# Patient Record
Sex: MALE | Race: WHITE | NOT HISPANIC OR LATINO | Employment: OTHER | URBAN - METROPOLITAN AREA
[De-identification: names, ages, dates, MRNs, and addresses within clinical notes are randomized per-mention and may not be internally consistent; named-entity substitution may affect disease eponyms.]

---

## 2017-02-13 DIAGNOSIS — J44.9 COPD (CHRONIC OBSTRUCTIVE PULMONARY DISEASE) (H): Primary | ICD-10-CM

## 2017-05-16 ENCOUNTER — CARE COORDINATION (OUTPATIENT)
Dept: PULMONOLOGY | Facility: CLINIC | Age: 62
End: 2017-05-16

## 2017-05-16 NOTE — PROGRESS NOTES
Patient called in to triage line and said that he has been coughing up green sputum since Friday of last week and wanted to know if Dr. Zimmerman would prescribe any antibiotics. Patient denies any fevers/chills, but did report that he has been sleeping a lot recently. Writer sent message to Dr. Zimmerman for recommendations. Patient is due to see Dr. Zimmerman on 5-24, but has not been seen by provider since November 2016. Per Dr. Zimmerman he recommends that the patient follow up with his PCP and if he is too ill to see PCP he should go to an urgent care. Writer gave this information to the patient who verbalized understanding of this information.

## 2017-05-24 ENCOUNTER — OFFICE VISIT (OUTPATIENT)
Dept: PULMONOLOGY | Facility: CLINIC | Age: 62
End: 2017-05-24
Attending: INTERNAL MEDICINE
Payer: COMMERCIAL

## 2017-05-24 VITALS
SYSTOLIC BLOOD PRESSURE: 142 MMHG | HEART RATE: 85 BPM | RESPIRATION RATE: 16 BRPM | WEIGHT: 131.1 LBS | OXYGEN SATURATION: 97 % | BODY MASS INDEX: 18.77 KG/M2 | HEIGHT: 70 IN | DIASTOLIC BLOOD PRESSURE: 88 MMHG

## 2017-05-24 DIAGNOSIS — Z87.891 HISTORY OF TOBACCO USE: Primary | ICD-10-CM

## 2017-05-24 DIAGNOSIS — J44.9 COPD (CHRONIC OBSTRUCTIVE PULMONARY DISEASE) (H): ICD-10-CM

## 2017-05-24 DIAGNOSIS — J01.90 ACUTE SINUSITIS, RECURRENCE NOT SPECIFIED, UNSPECIFIED LOCATION: ICD-10-CM

## 2017-05-24 LAB
DLCOUNC-%PRED-PRE: 61 %
DLCOUNC-PRE: 17.3 ML/MIN/MMHG
DLCOUNC-PRED: 28.33 ML/MIN/MMHG
ERV-%PRED-PRE: 60 %
ERV-PRE: 1.05 L
ERV-PRED: 1.74 L
EXPTIME-PRE: 18.9 SEC
FEF2575-%PRED-POST: 33 %
FEF2575-%PRED-PRE: 14 %
FEF2575-POST: 0.95 L/SEC
FEF2575-PRE: 0.41 L/SEC
FEF2575-PRED: 2.83 L/SEC
FEFMAX-%PRED-PRE: 42 %
FEFMAX-PRE: 3.91 L/SEC
FEFMAX-PRED: 9.14 L/SEC
FEV1-%PRED-PRE: 41 %
FEV1-PRE: 1.37 L
FEV1FEV6-PRE: 47 %
FEV1FEV6-PRED: 79 %
FEV1FVC-PRE: 39 %
FEV1FVC-PRED: 78 %
FEV1SVC-PRE: 35 %
FEV1SVC-PRED: 67 %
FIFMAX-PRE: 4.64 L/SEC
FVC-%PRED-PRE: 82 %
FVC-PRE: 3.47 L
FVC-PRED: 4.22 L
IC-%PRED-PRE: 90 %
IC-PRE: 2.88 L
IC-PRED: 3.17 L
VA-%PRED-PRE: 85 %
VA-PRE: 5.76 L
VC-%PRED-PRE: 79 %
VC-PRE: 3.92 L
VC-PRED: 4.91 L

## 2017-05-24 PROCEDURE — 99212 OFFICE O/P EST SF 10 MIN: CPT | Mod: ZF

## 2017-05-24 ASSESSMENT — PAIN SCALES - GENERAL: PAINLEVEL: NO PAIN (0)

## 2017-05-24 NOTE — NURSING NOTE
Chief Complaint   Patient presents with     Cough     Patient is being seen for follow up of cough         Zoey Velasquez CMA at 3:19 PM on 5/24/2017

## 2017-05-24 NOTE — LETTER
5/24/2017       RE: Blayne Gonzalez  [address]   [facility] [address]     Dear Colleague,    Thank you for referring your patient, Blayne Gonzalez, to the [facility] at [facility]. Please see a copy of my visit note below.    Pulmonary Clinic - Follow-up    I am assuming care for this patient who has been seen previously by other members of this group.   Chief Complaint   Patient presents with     Cough     Patient is being seen for follow up of cough         HPI:       This is a 61-year-old male who has moderate obstructive lung disease with subsequent successful smoking cessation.  He had been treated with   Symbicort and when necessary albuterol and he was not taking Spiriva secondary to cost.  On last visit we did work on getting him a coupon for Breo  Which she has now started using and he is actually taking these medications aside and using his  Symbicort before starting it.  We had recommended a CT scan of follow-up nodules noted back in 2011 2012 which he declined secondary to cost CT scan.  He does not meet criteria for lung cancer screening CT.  He recently had some increased respiratory symptoms with nasal congestion and a cough which is productive of dark green sputum.  He was treated with antibiotics (Augmentin) for 5 days there was initial improvement but continues to have some wheeze as well as some productive cough.   He has some symptoms of chest burning.  No fevers or chills.  He had [facility].       Review of Systems:   10 point ROS performed with pertinent +/- noted in the HPI.  The remainder of the ROS was otherwise negative.        Pertinent Medications     Current Outpatient Prescriptions   Medication     dextromethorphan-guaiFENesin (MUCINEX DM)  MG per 12 hr tablet     albuterol (PROAIR HFA, PROVENTIL HFA, VENTOLIN HFA) 108 (90 BASE) MCG/ACT inhaler     sildenafil  "(VIAGRA) 100 MG tablet     ibuprofen (ADVIL,MOTRIN) 600 MG tablet     fluticasone-vilanterol (BREO ELLIPTA) 100-25 MCG/INH oral inhaler     No current facility-administered medications for this visit.         Allergies:    None       Past Medical Hx:       COPD (chronic obstructive pulmonary disease)      Pulmonary nodules - has declined further follow up.        Social Hx:     Social History Narrative    The patient has a 40 pk yr tobacco hx.  He has no ongoing active use..  Alcohol use is 1 alcoholic drinks per week.  He does use of occasional marijuana in th past but no active use.  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  Has 0 children.          Objective   Vitals: /88 (BP Location: Right arm, Patient Position: Chair, Cuff Size: Adult Regular)  Pulse 85  Resp 16  Ht 1.765 m (5' 9.5\")  Wt 59.5 kg (131 lb 1.6 oz)  SpO2 97%  BMI 19.08 kg/m2    General:  Adult male;appears stated age; no acute distress; the patient is a good historian  HEENT:  NCAT; EOMI; No icterus; no injection; MMM  Pulm: Clear to auscultation,   Mild wheeze  CV: RRR, no murmurs, rubs or gallops  Extremities:  No cyanosis or clubbing, no edema  Neuro: Alert and oriented x 3, moves all extremities no obvious weakness        Labs / Imaging/Studies     No new imaging.          Assessment and Plan:   Assessment:   61-year-old male with moderate obstructive lung disease who is currently doing well except for a mild exacerbation with increased congestion and cough which is being treated with antibiotics for 5 days but with some residual symptoms.   At this time and would extend his course of Augmentin for about 7 days.  We did discuss the use of levofloxacin given the side effect profile and in particular the tendon issues we opted to use Augmentin.   Much of his cough and congestion seems to be more sinus in origin and therefore a more prolonged antibiotic course would be recommended He should continue his " current inhaler regimen.  We did discuss the possibility of doing a lung cancer screening CT scan as he is the criteria for this.  Nursing staff will evaluate the cost of such a test and we will get back to him.      1. History of tobacco use  See above  - Prof fee: Shared Decisionmaking for Lung Cancer Screening  - CT Chest Lung Cancer Scrn Low Dose wo; Future    2. Acute sinusitis, recurrence not specified, unspecified location  See above  - amoxicillin-clavulanate (AUGMENTIN) 875-125 MG per tablet; Take 1 tablet by mouth 2 times daily for 7 days  Dispense: 14 tablet; Refill: 0    3.  COPD - cont current regimen.     I spent 25 minutes with direct face to face interaction with this patient and provided at least 50% of this time counseling and coordinating care for COPD and pulmonary nodules.  as noted above in the assessment and plan.      Jerry Zimmerman MD  Pulmonary and Critical Care  [facility]  Pager:  [phone]

## 2017-05-24 NOTE — MR AVS SNAPSHOT
After Visit Summary   5/24/2017    Blayne Gonzalez    MRN: [id number]           Patient Information     Date Of Birth          1955        Visit Information        Provider Department      5/24/2017 3:10 PM Jerry Zimmerman MD [facility]        Today's Diagnoses     History of tobacco use    -  1    Acute sinusitis, recurrence not specified, unspecified location          Care Instructions    We will contact you about lung cancer screening CT scan costs.   Continue the inhalers.   Augmentin for 7 days          Lung Cancer Screening   Frequently Asked Questions  If you are at high-risk for lung cancer, getting screened with low-dose computed tomography (LDCT) every year can help save your life. This handout offers answers to some of the most common questions about lung cancer screening. If you have other questions, please call [phone] ([phone]).     What is it?  Lung cancer screening uses special X-ray technology to create an image of your lung tissue. The exam is quick and easy and takes less than 10 seconds. We don t give you any medicine or use any needles. You can eat before and after the exam. You don t need to change your clothes as long as the clothing on your chest doesn t contain metal. But, you do need to be able to hold your breath for at least 6 seconds during the exam.    What is the goal of lung cancer screening?  The goal of lung cancer screening is to save lives. Many times, lung cancer is not found until a person starts having physical symptoms. Lung cancer screening can help detect lung cancer in the earliest stages when it may be easier to treat.    Who should be screened for lung cancer?  We suggest lung cancer screening for anyone who is at high-risk for lung cancer. You are in the high-risk group if you:      are between the ages of 55 and 79, and    have smoked at least 1 pack of cigarettes a day for 30 or more years,  and    still smoke or have quit within the past 15 years.    However, if you have a new cough or shortness of breath, you should talk to your doctor before being screened.    Some national lung health advocacy groups also recommend screening for people ages 50 to 79 who have smoked an average of 1 pack of cigarettes a day for 20 years. They must also have at least 1 other risk factor for lung cancer, not including exposure to secondhand smoke. Other risk factors are having had cancer in the past, emphysema, pulmonary fibrosis, COPD, a family history of lung cancer, or exposure to certain materials such as arsenic, asbestos, beryllium, cadmium, chromium, diesel fumes, nickel, radon or silica. Your care team can help you know if you have one of these risk factors.     Why does it matter if I have symptoms?  Certain symptoms can be a sign that you have a condition in your lungs that should be checked and treated by your doctor. These symptoms include fever, chest pain, a new or changing cough, shortness of breath that you have never felt before, coughing up blood or unexplained weight loss. Having any of these symptoms can greatly affect the results of lung cancer screening.       Should all smokers get an LDCT lung cancer screening exam?  It depends. Lung cancer screening is for a very specific group of men and women who have a history of heavy smoking over a long period of time (see  Who should be screened for lung cancer  above).  I am in the high-risk group, but have been diagnosed with cancer in the past. Is LDCT lung cancer screening right for me?  In some cases, you should not have LDCT lung screening, such as when your doctor is already following your cancer with CT scan studies. Your doctor will help you decide if LDCT lung screening is right for you.  Do I need to have a screening exam every year?  Yes. If you are in the high-risk group described earlier, you should get an LDCT lung cancer screening exam  every year until you are 79, or are no longer willing or able to undergo screening and possible procedures to diagnose and treat lung cancer.  How effective is LDCT at preventing death from lung cancer?  Studies have shown that LDCT lung cancer screening can lower the risk of death from lung cancer by 20 percent in people who are at high-risk.  What are the risks?  There are some risks and limitations of LDCT lung cancer screening. We want to make sure you understand the risks and benefits, so please let us know if you have any questions. Your doctor may want to talk with you more about these risks.    Radiation exposure: As with any exam that uses radiation, there is a very small increased risk of cancer. The amount of radiation in LDCT is small--about the same amount a person would get from a mammogram. Your doctor orders the exam when he or she feels the potential benefits outweigh the risks.    False negatives: No test is perfect, including LDCT. It is possible that you may have a medical condition, including lung cancer, that is not found during your exam. This is called a false negative result.    False positives and more testing: LDCT very often finds something in the lung that could be cancer, but in fact is not. This is called a false positive result. False positive tests often cause anxiety. To make sure these findings are not cancer, you may need to have more tests. These tests will be done only if you give us permission. Sometimes patients need a treatment that can have side effects, such as a biopsy. For more information on false positives, see  What can I expect from the results?     Findings not related to lung cancer: Your LDCT exam also takes pictures of areas of your body next to your lungs. In a very small number of cases, the CT scan will show an abnormal finding in one of these areas, such as your kidneys, adrenal glands, liver or thyroid. This finding may not be serious, but you may need more  tests. Your doctor can help you decide what other tests you may need, if any.  What can I expect from the results?  About 1 out of 4 LDCT exams will find something that may need more tests. Most of the time, these findings are lung nodules. Lung nodules are very small collections of tissue in the lung. These nodules are very common, and the vast majority--more than 97 percent--are not cancer (benign). Most are normal lymph nodes or small areas of scarring from past infections.  But, if a small lung nodule is found to be cancer, the cancer can be cured more than 90 percent of the time. To know if the nodule is cancer, we may need to get more images before your next yearly screening exam. If the nodule has suspicious features (for example, it is large, has an odd shape or grows over time), we will refer you to a specialist for further testing.  Will my doctor also get the results?  Yes. Your doctor will get a copy of your results.  Is it okay to keep smoking now that there s a cancer screening exam?  No. Tobacco is one of the strongest cancer-causing agents. It causes not only lung cancer, but other cancers and cardiovascular (heart) diseases as well. The damage caused by smoking builds over time. This means that the longer you smoke, the higher your risk of disease. While it is never too late to quit, the sooner you quit, the better.  Where can I find help to quit smoking?  The best way to prevent lung cancer is to stop smoking. If you have already quit smoking, congratulations and keep it up! For help on quitting smoking, please call [employer] at 7-115-137-HLPZ ([phone]) or the American Cancer Society at 1-331.633.8876 to find local resources near you.  One-on-one health coaching:  If you d prefer to work individually with a health care provider on tobacco cessation, we offer:      Medication Therapy Management:  Our specially trained pharmacists work closely with you and your doctor to help you quit smoking.  Call  "[phone] or [phone] (toll free).     Can Do: Health coaching offered by [facility].  [email or web address]            Follow-ups after your visit        Follow-up notes from your care team     Return in about 6 months (around 2017).      Future tests that were ordered for you today     Open Future Orders        Priority Expected Expires Ordered    CT Chest Lung Cancer Scrn Low Dose wo Routine  2018            Who to contact     If you have questions or need follow up information about today's clinic visit or your schedule please contact [facility] directly at [phone].  Normal or non-critical lab and imaging results will be communicated to you by Allclass[employer]art, letter or phone within 4 business days after the clinic has received the results. If you do not hear from us within 7 days, please contact the clinic through Allclasseshart or phone. If you have a critical or abnormal lab result, we will notify you by phone as soon as possible.  Submit refill requests through [employer] or call your pharmacy and they will forward the refill request to us. Please allow 3 business days for your refill to be completed.          Additional Information About Your Visit        Allclasseshart Information     [employer] lets you send messages to your doctor, view your test results, renew your prescriptions, schedule appointments and more. To sign up, go to [email or web address] . Click on \"Log in\" on the left side of the screen, which will take you to the Welcome page. Then click on \"Sign up Now\" on the right side of the page.     You will be asked to enter the access code listed below, as well as some personal information. Please follow the directions to create your username and password.     Your access code is: [email or web address]  Expires: 2017  6:30 AM     Your access code will  in 90 days. If you need help or a new code, please call your [facility] clinic or " "[phone].        Care EveryWhere ID     This is your Care EveryWhere ID. This could be used by other organizations to access your [facility] medical records  [id number]        Your Vitals Were     Pulse Respirations Height Pulse Oximetry BMI (Body Mass Index)       85 16 1.765 m (5' 9.5\") 97% 19.08 kg/m2        Blood Pressure from Last 3 Encounters:   05/24/17 142/88   11/23/16 134/83   10/07/15 (!) 131/92    Weight from Last 3 Encounters:   05/24/17 59.5 kg (131 lb 1.6 oz)   10/07/15 58.1 kg (128 lb)   02/12/14 60.3 kg (133 lb)              We Performed the Following     Prof fee: Shared Decisionmaking for Lung Cancer Screening          Today's Medication Changes          These changes are accurate as of: 5/24/17  4:21 PM.  If you have any questions, ask your nurse or doctor.               Start taking these medicines.        Dose/Directions    amoxicillin-clavulanate 875-125 MG per tablet   Commonly known as:  AUGMENTIN   Used for:  Acute sinusitis, recurrence not specified, unspecified location   Started by:  Jerry Zimmerman MD        Dose:  1 tablet   Take 1 tablet by mouth 2 times daily for 7 days   Quantity:  14 tablet   Refills:  0            Where to get your medicines      These medications were sent to [facility] [address] [place] [address] AT [address] [address]    Hours:  24-hours Phone:  [phone]     amoxicillin-clavulanate 875-125 MG per tablet                Primary Care Provider Office Phone # Fax #    Isaiah Odonnell [phone]        [facility] [address] [facility] [address]  [facility] [address]        Thank you!     Thank you for choosing [facility]  for your care. Our goal is always to provide you with excellent care. Hearing back from our patients is one way we can continue to improve our services. Please take a few minutes to complete the written " survey that you may receive in the mail after your visit with us. Thank you!             Your Updated Medication List - Protect others around you: Learn how to safely use, store and throw away your medicines at www.disposemymeds.org.          This list is accurate as of: 5/24/17  4:21 PM.  Always use your most recent med list.                   Brand Name Dispense Instructions for use    albuterol 108 (90 BASE) MCG/ACT Inhaler    PROAIR HFA/PROVENTIL HFA/VENTOLIN HFA    1 Inhaler    Inhale 2 puffs into the lungs every 6 hours as needed for shortness of breath / dyspnea or wheezing       amoxicillin-clavulanate 875-125 MG per tablet    AUGMENTIN    14 tablet    Take 1 tablet by mouth 2 times daily for 7 days       dextromethorphan-guaiFENesin  MG per 12 hr tablet    MUCINEX DM     Take 1 tablet by mouth every 12 hours       fluticasone-vilanterol 100-25 MCG/INH oral inhaler    BREO ELLIPTA    1 Inhaler    Inhale 1 puff into the lungs daily       ibuprofen 600 MG tablet    ADVIL/MOTRIN     Take 600 mg by mouth every 6 hours as needed.       sildenafil 100 MG cap/tab    VIAGRA    8 tablet    Take 1 tablet (100 mg) by mouth daily as needed for erectile dysfunction Take 30 min to 60min before intercourse.

## 2017-05-24 NOTE — PROGRESS NOTES
Pulmonary Clinic - Follow-up    I am assuming care for this patient who has been seen previously by other members of this group.   Chief Complaint   Patient presents with     Cough     Patient is being seen for follow up of cough         HPI:       This is a 61-year-old male who has moderate obstructive lung disease with subsequent successful smoking cessation.  He had been treated with   Symbicort and when necessary albuterol and he was not taking Spiriva secondary to cost.  On last visit we did work on getting him a coupon for Breo  Which she has now started using and he is actually taking these medications aside and using his  Symbicort before starting it.  We had recommended a CT scan of follow-up nodules noted back in 2011 2012 which he declined secondary to cost CT scan.  He does not meet criteria for lung cancer screening CT.  He recently had some increased respiratory symptoms with nasal congestion and a cough which is productive of dark green sputum.  He was treated with antibiotics (Augmentin) for 5 days there was initial improvement but continues to have some wheeze as well as some productive cough.   He has some symptoms of chest burning.  No fevers or chills.  [facility].       Review of Systems:   10 point ROS performed with pertinent +/- noted in the HPI.  The remainder of the ROS was otherwise negative.        Pertinent Medications     Current Outpatient Prescriptions   Medication     dextromethorphan-guaiFENesin (MUCINEX DM)  MG per 12 hr tablet     albuterol (PROAIR HFA, PROVENTIL HFA, VENTOLIN HFA) 108 (90 BASE) MCG/ACT inhaler     sildenafil (VIAGRA) 100 MG tablet     ibuprofen (ADVIL,MOTRIN) 600 MG tablet     fluticasone-vilanterol (BREO ELLIPTA) 100-25 MCG/INH oral inhaler     No current facility-administered medications for this visit.         Allergies:    None       Past Medical Hx:       COPD (chronic obstructive pulmonary disease)       "Pulmonary nodules - has declined further follow up.        Social Hx:     Social History Narrative    The patient has a 40 pk yr tobacco hx.  He has no ongoing active use..  Alcohol use is 1 alcoholic drinks per week.  He does use of occasional marijuana in th past but no active use.  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  Has 0 children.          Objective   Vitals: /88 (BP Location: Right arm, Patient Position: Chair, Cuff Size: Adult Regular)  Pulse 85  Resp 16  Ht 1.765 m (5' 9.5\")  Wt 59.5 kg (131 lb 1.6 oz)  SpO2 97%  BMI 19.08 kg/m2    General:  Adult male;appears stated age; no acute distress; the patient is a good historian  HEENT:  NCAT; EOMI; No icterus; no injection; MMM  Pulm: Clear to auscultation,   Mild wheeze  CV: RRR, no murmurs, rubs or gallops  Extremities:  No cyanosis or clubbing, no edema  Neuro: Alert and oriented x 3, moves all extremities no obvious weakness        Labs / Imaging/Studies     No new imaging.          Assessment and Plan:   Assessment:   61-year-old male with moderate obstructive lung disease who is currently doing well except for a mild exacerbation with increased congestion and cough which is being treated with antibiotics for 5 days but with some residual symptoms.   At this time and would extend his course of Augmentin for about 7 days.  We did discuss the use of levofloxacin given the side effect profile and in particular the tendon issues we opted to use Augmentin.   Much of his cough and congestion seems to be more sinus in origin and therefore a more prolonged antibiotic course would be recommended He should continue his current inhaler regimen.  We did discuss the possibility of doing a lung cancer screening CT scan as he is the criteria for this.  Nursing staff will evaluate the cost of such a test and we will get back to him.      1. History of tobacco use  See above  - Prof fee: Shared Decisionmaking for Lung Cancer " Screening  - CT Chest Lung Cancer Scrn Low Dose wo; Future    2. Acute sinusitis, recurrence not specified, unspecified location  See above  - amoxicillin-clavulanate (AUGMENTIN) 875-125 MG per tablet; Take 1 tablet by mouth 2 times daily for 7 days  Dispense: 14 tablet; Refill: 0    3.  COPD - cont current regimen.     I spent 25 minutes with direct face to face interaction with this patient and provided at least 50% of this time counseling and coordinating care for COPD and pulmonary nodules.  as noted above in the assessment and plan.      Jerry Zimmerman MD  Pulmonary and Critical Care  [facility]  Pager:  [phone]

## 2017-05-24 NOTE — PATIENT INSTRUCTIONS
We will contact you about lung cancer screening CT scan costs.   Continue the inhalers.   Augmentin for 7 days          Lung Cancer Screening   Frequently Asked Questions  If you are at high-risk for lung cancer, getting screened with low-dose computed tomography (LDCT) every year can help save your life. This handout offers answers to some of the most common questions about lung cancer screening. If you have other questions, please call [phone] ([phone]).     What is it?  Lung cancer screening uses special X-ray technology to create an image of your lung tissue. The exam is quick and easy and takes less than 10 seconds. We don t give you any medicine or use any needles. You can eat before and after the exam. You don t need to change your clothes as long as the clothing on your chest doesn t contain metal. But, you do need to be able to hold your breath for at least 6 seconds during the exam.    What is the goal of lung cancer screening?  The goal of lung cancer screening is to save lives. Many times, lung cancer is not found until a person starts having physical symptoms. Lung cancer screening can help detect lung cancer in the earliest stages when it may be easier to treat.    Who should be screened for lung cancer?  We suggest lung cancer screening for anyone who is at high-risk for lung cancer. You are in the high-risk group if you:      are between the ages of 55 and 79, and    have smoked at least 1 pack of cigarettes a day for 30 or more years, and    still smoke or have quit within the past 15 years.    However, if you have a new cough or shortness of breath, you should talk to your doctor before being screened.    Some national lung health advocacy groups also recommend screening for people ages 50 to 79 who have smoked an average of 1 pack of cigarettes a day for 20 years. They must also have at least 1 other risk factor for lung cancer, not including exposure to secondhand smoke. Other  risk factors are having had cancer in the past, emphysema, pulmonary fibrosis, COPD, a family history of lung cancer, or exposure to certain materials such as arsenic, asbestos, beryllium, cadmium, chromium, diesel fumes, nickel, radon or silica. Your care team can help you know if you have one of these risk factors.     Why does it matter if I have symptoms?  Certain symptoms can be a sign that you have a condition in your lungs that should be checked and treated by your doctor. These symptoms include fever, chest pain, a new or changing cough, shortness of breath that you have never felt before, coughing up blood or unexplained weight loss. Having any of these symptoms can greatly affect the results of lung cancer screening.       Should all smokers get an LDCT lung cancer screening exam?  It depends. Lung cancer screening is for a very specific group of men and women who have a history of heavy smoking over a long period of time (see  Who should be screened for lung cancer  above).  I am in the high-risk group, but have been diagnosed with cancer in the past. Is LDCT lung cancer screening right for me?  In some cases, you should not have LDCT lung screening, such as when your doctor is already following your cancer with CT scan studies. Your doctor will help you decide if LDCT lung screening is right for you.  Do I need to have a screening exam every year?  Yes. If you are in the high-risk group described earlier, you should get an LDCT lung cancer screening exam every year until you are 79, or are no longer willing or able to undergo screening and possible procedures to diagnose and treat lung cancer.  How effective is LDCT at preventing death from lung cancer?  Studies have shown that LDCT lung cancer screening can lower the risk of death from lung cancer by 20 percent in people who are at high-risk.  What are the risks?  There are some risks and limitations of LDCT lung cancer screening. We want to make sure  you understand the risks and benefits, so please let us know if you have any questions. Your doctor may want to talk with you more about these risks.    Radiation exposure: As with any exam that uses radiation, there is a very small increased risk of cancer. The amount of radiation in LDCT is small--about the same amount a person would get from a mammogram. Your doctor orders the exam when he or she feels the potential benefits outweigh the risks.    False negatives: No test is perfect, including LDCT. It is possible that you may have a medical condition, including lung cancer, that is not found during your exam. This is called a false negative result.    False positives and more testing: LDCT very often finds something in the lung that could be cancer, but in fact is not. This is called a false positive result. False positive tests often cause anxiety. To make sure these findings are not cancer, you may need to have more tests. These tests will be done only if you give us permission. Sometimes patients need a treatment that can have side effects, such as a biopsy. For more information on false positives, see  What can I expect from the results?     Findings not related to lung cancer: Your LDCT exam also takes pictures of areas of your body next to your lungs. In a very small number of cases, the CT scan will show an abnormal finding in one of these areas, such as your kidneys, adrenal glands, liver or thyroid. This finding may not be serious, but you may need more tests. Your doctor can help you decide what other tests you may need, if any.  What can I expect from the results?  About 1 out of 4 LDCT exams will find something that may need more tests. Most of the time, these findings are lung nodules. Lung nodules are very small collections of tissue in the lung. These nodules are very common, and the vast majority--more than 97 percent--are not cancer (benign). Most are normal lymph nodes or small areas of  scarring from past infections.  But, if a small lung nodule is found to be cancer, the cancer can be cured more than 90 percent of the time. To know if the nodule is cancer, we may need to get more images before your next yearly screening exam. If the nodule has suspicious features (for example, it is large, has an odd shape or grows over time), we will refer you to a specialist for further testing.  Will my doctor also get the results?  Yes. Your doctor will get a copy of your results.  Is it okay to keep smoking now that there s a cancer screening exam?  No. Tobacco is one of the strongest cancer-causing agents. It causes not only lung cancer, but other cancers and cardiovascular (heart) diseases as well. The damage caused by smoking builds over time. This means that the longer you smoke, the higher your risk of disease. While it is never too late to quit, the sooner you quit, the better.  Where can I find help to quit smoking?  The best way to prevent lung cancer is to stop smoking. If you have already quit smoking, congratulations and keep it up! For help on quitting smoking, please call [employer] at 8-674-328-LSLC ([phone]) or the American [employer] Society at 1-493.597.5701 to find local resources near you.  One-on-one health coaching:  If you d prefer to work individually with a health care provider on tobacco cessation, we offer:      Medication Therapy Management:  Our specially trained pharmacists work closely with you and your doctor to help you quit smoking.  Call [phone] or [phone] (toll free).     Can Do: Health coaching offered by [facility].  [email or web address]

## 2017-05-25 ENCOUNTER — CARE COORDINATION (OUTPATIENT)
Dept: PULMONOLOGY | Facility: CLINIC | Age: 62
End: 2017-05-25

## 2017-05-25 ENCOUNTER — TELEPHONE (OUTPATIENT)
Dept: PULMONOLOGY | Facility: CLINIC | Age: 62
End: 2017-05-25

## 2017-05-25 NOTE — TELEPHONE ENCOUNTER
Contacted patient's insurance to determine if he is covered for chest ct lung cancer screen low dose ordered by Dr Zimmerman. Patient is covered per Danielle at "[employer] and [facility] is in his network. Spoke with patient and gave information. He will call imaging to schedule his chest ct after completing antibiotic prescribed yesterday.

## 2017-05-25 NOTE — PROGRESS NOTES
Telephone Encounter: Incoming    Reason for Call: Dr. Zimmerman gave Blayne a prescription for abx, at his office visit yesterday, for treatment of URI.  Blayne states that he is feeling a little more SOB and wondering whether a prednisone burst may be helpful.    Nursing Action/Patient Instruction: Advised patient to continue taking abx that he started yesterday, if he continues to feel SOB or this becomes worse he should call back in to clinic and we will review with Dr. Zimmerman.  Patient Response/Questions/Concerns: Blayne agrees with plan and has no further questions at this time.

## 2018-02-16 DIAGNOSIS — J43.9 PULMONARY EMPHYSEMA, UNSPECIFIED EMPHYSEMA TYPE (H): Primary | ICD-10-CM

## 2018-10-03 ENCOUNTER — OFFICE VISIT (OUTPATIENT)
Dept: PULMONOLOGY | Facility: CLINIC | Age: 63
End: 2018-10-03
Attending: INTERNAL MEDICINE
Payer: COMMERCIAL

## 2018-10-03 VITALS
RESPIRATION RATE: 17 BRPM | BODY MASS INDEX: 17.9 KG/M2 | HEIGHT: 70 IN | OXYGEN SATURATION: 97 % | DIASTOLIC BLOOD PRESSURE: 83 MMHG | HEART RATE: 76 BPM | SYSTOLIC BLOOD PRESSURE: 120 MMHG | WEIGHT: 125 LBS

## 2018-10-03 DIAGNOSIS — J43.9 PULMONARY EMPHYSEMA, UNSPECIFIED EMPHYSEMA TYPE (H): ICD-10-CM

## 2018-10-03 DIAGNOSIS — J44.9 COPD (CHRONIC OBSTRUCTIVE PULMONARY DISEASE) (H): Primary | ICD-10-CM

## 2018-10-03 PROCEDURE — 90686 IIV4 VACC NO PRSV 0.5 ML IM: CPT | Mod: ZF | Performed by: INTERNAL MEDICINE

## 2018-10-03 PROCEDURE — 25000128 H RX IP 250 OP 636: Mod: ZF | Performed by: INTERNAL MEDICINE

## 2018-10-03 PROCEDURE — G0463 HOSPITAL OUTPT CLINIC VISIT: HCPCS | Mod: 25

## 2018-10-03 PROCEDURE — G0463 HOSPITAL OUTPT CLINIC VISIT: HCPCS | Mod: ZF

## 2018-10-03 PROCEDURE — G0008 ADMIN INFLUENZA VIRUS VAC: HCPCS

## 2018-10-03 RX ORDER — PREDNISONE 20 MG/1
40 TABLET ORAL DAILY
Qty: 10 TABLET | Refills: 3 | Status: SHIPPED | OUTPATIENT
Start: 2018-10-03 | End: 2019-03-19

## 2018-10-03 RX ORDER — ALBUTEROL SULFATE 90 UG/1
2 AEROSOL, METERED RESPIRATORY (INHALATION) EVERY 6 HOURS PRN
Qty: 1 INHALER | Refills: 11 | Status: SHIPPED | OUTPATIENT
Start: 2018-10-03 | End: 2019-07-30

## 2018-10-03 RX ORDER — DOXYCYCLINE HYCLATE 50 MG/1
100 CAPSULE ORAL 2 TIMES DAILY
Qty: 14 CAPSULE | Refills: 3 | Status: SHIPPED | OUTPATIENT
Start: 2018-10-03 | End: 2019-03-19

## 2018-10-03 RX ADMIN — INFLUENZA A VIRUS A/MICHIGAN/45/2015 X-275 (H1N1) ANTIGEN (FORMALDEHYDE INACTIVATED), INFLUENZA A VIRUS A/SINGAPORE/INFIMH-16-0019/2016 IVR-186 (H3N2) ANTIGEN (FORMALDEHYDE INACTIVATED), INFLUENZA B VIRUS B/PHUKET/3073/2013 ANTIGEN (FORMALDEHYDE INACTIVATED), AND INFLUENZA B VIRUS B/MARYLAND/15/2016 BX-69A ANTIGEN (FORMALDEHYDE INACTIVATED) 0.5 ML: 15; 15; 15; 15 INJECTION, SUSPENSION INTRAMUSCULAR at 15:45

## 2018-10-03 ASSESSMENT — PAIN SCALES - GENERAL: PAINLEVEL: NO PAIN (0)

## 2018-10-03 NOTE — PATIENT INSTRUCTIONS
1. Continue with Breo and albuterol   2. Ordered home action plan for you   3. Flu shot today    Return to clinic in 6 months

## 2018-10-03 NOTE — PROGRESS NOTES
Pulmonary Clinic Follow-Up Visit Note    Reason for visit: COPD follow-up    HPI/Interval History:   62-year-old male with history of COPD who seen for follow-up.  He was previously seen by my colleague Dr. Zimmerman (last seen in clinic May 2017).  At that time he had moderately severe obstruction with an FEV1 of 1.37 L was initiated on Breo as well as as needed albuterol.  He returns today for follow-up and is doing quite well.  He reports 0 symptoms of dyspnea or cough.  He has not had any exacerbations since last being seen.  He does not use albuterol rescue inhaler at all.  He quit smoking 3 years ago this month does not have any secondhand exposure.  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  Otherwise is feeling well and has no complaints    Pulmonary function test today show a marked improvement is obstruction.  FEV1 is now 1.59 L.    PMH:  Past Medical History:   Diagnosis Date     COPD (chronic obstructive pulmonary disease) (H)      Pulmonary nodule        Allergies:  No Known Allergies    Social History:  Social History     Social History     Marital status: Single     Spouse name: N/A     Number of children: N/A     Years of education: N/A     Occupational History     Not on file.     Social History Main Topics     Smoking status: Former Smoker     Packs/day: 0.50     Years: 38.00     Types: Cigarettes     Quit date: 10/5/2015     Smokeless tobacco: Never Used      Comment: cutting down     Alcohol use 0.0 oz/week     0 Standard drinks or equivalent per week      Comment: one beer every few months     Drug use: Yes      Comment: history  of marijuana use within the year     Sexual activity: Yes     Partners: Female     Other Topics Concern     Not on file     Social History Narrative    The patient has a 40 pk yr tobacco hx.  He has no ongoing active use.  Alcohol use is 1 alcoholic drinks per week.  He does use of  "occasional marijuana.          He is employed as a hernandez and builder. .          The patient is single.  Has 0 children.               Medications:  Current Outpatient Prescriptions   Medication Sig Dispense Refill     albuterol (PROAIR HFA, PROVENTIL HFA, VENTOLIN HFA) 108 (90 BASE) MCG/ACT inhaler Inhale 2 puffs into the lungs every 6 hours as needed for shortness of breath / dyspnea or wheezing 1 Inhaler 11     albuterol (PROAIR HFA/PROVENTIL HFA/VENTOLIN HFA) 108 (90 Base) MCG/ACT inhaler Inhale 2 puffs into the lungs every 6 hours as needed for shortness of breath / dyspnea or wheezing 1 Inhaler 11     dextromethorphan-guaiFENesin (MUCINEX DM)  MG per 12 hr tablet Take 1 tablet by mouth as needed        doxycycline (VIBRAMYCIN) 50 MG capsule Take 2 capsules (100 mg) by mouth 2 times daily for 7 days 14 capsule 3     fluticasone-vilanterol (BREO ELLIPTA) 100-25 MCG/INH inhaler Inhale 1 puff into the lungs daily 1 Inhaler 11     ibuprofen (ADVIL,MOTRIN) 600 MG tablet Take 600 mg by mouth every 6 hours as needed.       predniSONE (DELTASONE) 20 MG tablet Take 2 tablets (40 mg) by mouth daily for 5 days 10 tablet 3     sildenafil (VIAGRA) 100 MG tablet Take 1 tablet (100 mg) by mouth daily as needed for erectile dysfunction Take 30 min to 60min before intercourse. 8 tablet 12       Family History:  No family history on file.    Physical Exam:  /83  Pulse 76  Resp 17  Ht 1.765 m (5' 9.5\")  Wt 56.7 kg (125 lb)  SpO2 97%  BMI 18.19 kg/m2    General: Sitting in the chair in NAD  HEENT: anicteric, moist mucosa  Neck: no palpable lymphadenopathy, no JVD noted  Chest: Diminished throughout, no wheezing  Cardiac: RRR no murmurs  Abdomen: Soft, flat, non tender, active BS  Extremities: No LE Edema  Neuro: A&Ox3, no focal defecits  Skin: no rash noted    Labs and Radiology:  No new labs or imaging    PFT's:        Assessment and Plan:  Blayne Gonzalez is a 62-year-old male with history of COPD who seen " for follow-up of his COPD.  He was previously seen by my colleague Dr. Zimmerman (last seen in clinic May 2017).     COPD: Moderately severe obstruction on pulmonary function tests with significant bronchodilator response noted on previous exams.  He is now on Breo with no symptoms and no recent exacerbations.  Pulmonary function test today show over 200 cc of improvement in his FEV1.    -Continue Breo and albuterol as needed  -Ordered home action plan (prednisone 40 mg x 5 days and doxycycline 100 mg twice a day x 7 days)  -Flu shot administered today, he is up-to-date on his Pneumovax vaccinations  -He has hesitant to do low-dose CT lung cancer screening, will discuss further at next appointment    Return to clinic in 6 months    Seen and staffed with Dr. Nelson.    Raymond Brannon MD  Pulmonary & Critical Care Fellow  [phone]    Attending statement:    The patient was seen and examined by me with the pulmonary fellow, Dr Brannon.  The case was discussed at length.  Vitals, lab results and imaging from our visit were reviewed.  The note written by Dr Brannon above reflects our joint assessment and plan.    Daren Nelson MD

## 2018-10-03 NOTE — LETTER
10/3/2018       RE: Blayne Gonzalez  [address]  [facility] [address]     Dear Colleague,    Thank you for referring your patient, Blayne Gonzalez, to the [facility]. Please see a copy of my visit note below.    Pulmonary Clinic Follow-Up Visit Note    Reason for visit: COPD follow-up    HPI/Interval History:   62-year-old male with history of COPD who seen for follow-up.  He was previously seen by my colleague Dr. Zimmerman (last seen in clinic May 2017).  At that time he had moderately severe obstruction with an FEV1 of 1.37 L was initiated on Breo as well as as needed albuterol.  He returns today for follow-up and is doing quite well.  He reports 0 symptoms of dyspnea or cough.  He has not had any exacerbations since last being seen.  He does not use albuterol rescue inhaler at all.  He quit smoking 3 years ago this month does not have any secondhand exposure.  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  Otherwise is feeling well and has no complaints    Pulmonary function test today show a marked improvement is obstruction.  FEV1 is now 1.59 L.    PMH:  Past Medical History:   Diagnosis Date     COPD (chronic obstructive pulmonary disease) (H)      Pulmonary nodule        Allergies:  No Known Allergies    Social History:  Social History     Social History     Marital status: Single     Spouse name: N/A     Number of children: N/A     Years of education: N/A     Occupational History     Not on file.     Social History Main Topics     Smoking status: Former Smoker     Packs/day: 0.50     Years: 38.00     Types: Cigarettes     Quit date: 10/5/2015     Smokeless tobacco: Never Used      Comment: cutting down     Alcohol use 0.0 oz/week     0 Standard drinks or equivalent per week      Comment: one beer every few months     Drug use:  "Yes      Comment: history  of marijuana use within the year     Sexual activity: Yes     Partners: Female     Other Topics Concern     Not on file     Social History Narrative    The patient has a 40 pk yr tobacco hx.  He has no ongoing active use.  Alcohol use is 1 alcoholic drinks per week.  He does use of occasional marijuana.          He is employed as a hernandez and builder. .          The patient is single.  Has 0 children.               Medications:  Current Outpatient Prescriptions   Medication Sig Dispense Refill     albuterol (PROAIR HFA, PROVENTIL HFA, VENTOLIN HFA) 108 (90 BASE) MCG/ACT inhaler Inhale 2 puffs into the lungs every 6 hours as needed for shortness of breath / dyspnea or wheezing 1 Inhaler 11     albuterol (PROAIR HFA/PROVENTIL HFA/VENTOLIN HFA) 108 (90 Base) MCG/ACT inhaler Inhale 2 puffs into the lungs every 6 hours as needed for shortness of breath / dyspnea or wheezing 1 Inhaler 11     dextromethorphan-guaiFENesin (MUCINEX DM)  MG per 12 hr tablet Take 1 tablet by mouth as needed        doxycycline (VIBRAMYCIN) 50 MG capsule Take 2 capsules (100 mg) by mouth 2 times daily for 7 days 14 capsule 3     fluticasone-vilanterol (BREO ELLIPTA) 100-25 MCG/INH inhaler Inhale 1 puff into the lungs daily 1 Inhaler 11     ibuprofen (ADVIL,MOTRIN) 600 MG tablet Take 600 mg by mouth every 6 hours as needed.       predniSONE (DELTASONE) 20 MG tablet Take 2 tablets (40 mg) by mouth daily for 5 days 10 tablet 3     sildenafil (VIAGRA) 100 MG tablet Take 1 tablet (100 mg) by mouth daily as needed for erectile dysfunction Take 30 min to 60min before intercourse. 8 tablet 12       Family History:  No family history on file.    Physical Exam:  /83  Pulse 76  Resp 17  Ht 1.765 m (5' 9.5\")  Wt 56.7 kg (125 lb)  SpO2 97%  BMI 18.19 kg/m2    General: Sitting in the chair in NAD  HEENT: anicteric, moist mucosa  Neck: no palpable lymphadenopathy, no JVD noted  Chest: Diminished throughout, no " wheezing  Cardiac: RRR no murmurs  Abdomen: Soft, flat, non tender, active BS  Extremities: No LE Edema  Neuro: A&Ox3, no focal defecits  Skin: no rash noted    Labs and Radiology:  No new labs or imaging    PFT's:        Assessment and Plan:  Blayne Gonzalez is a 62-year-old male with history of COPD who seen for follow-up of his COPD.  He was previously seen by my colleague Dr. Zimmerman (last seen in clinic May 2017).     COPD: Moderately severe obstruction on pulmonary function tests with significant bronchodilator response noted on previous exams.  He is now on Breo with no symptoms and no recent exacerbations.  Pulmonary function test today show over 200 cc of improvement in his FEV1.    -Continue Breo and albuterol as needed  -Ordered home action plan (prednisone 40 mg x 5 days and doxycycline 100 mg twice a day x 7 days)  -Flu shot administered today, he is up-to-date on his Pneumovax vaccinations  -He has hesitant to do low-dose CT lung cancer screening, will discuss further at next appointment    Return to clinic in 6 months    Seen and staffed with Dr. Nelson.    Raymond Brannon MD  Pulmonary & Critical Care Fellow  [phone]    Attending statement:    The patient was seen and examined by me with the pulmonary fellow, Dr Brannon.  The case was discussed at length.  Vitals, lab results and imaging from our visit were reviewed.  The note written by Dr Brannon above reflects our joint assessment and plan.    Daren Nelson MD

## 2018-10-08 ENCOUNTER — TELEPHONE (OUTPATIENT)
Dept: PULMONOLOGY | Facility: CLINIC | Age: 63
End: 2018-10-08

## 2018-10-08 NOTE — TELEPHONE ENCOUNTER
Health Call Center    Phone Message    May a detailed message be left on voicemail: no    Reason for Call: Medication Question or concern regarding medication   Prescription Clarification  Name of Medication: fluticasone-vilanterol (BREO ELLIPTA) 100-25 MCG/INH inhaler  Prescribing Provider: Dr. Brannon   Pharmacy: [facility]   What on the order needs clarification? Pt says the cost of the medication is too high, and has already used a coupon for another refill. He cannot use another one with that . He wants to know if he can be prescribed an alternative that is cheaper or a generic. Please contact Pt.          Action Taken: Message routed to:  [facility] ([facility]

## 2018-10-08 NOTE — TELEPHONE ENCOUNTER
Advised pt that he should call insurance to find out most cost effective combined ICS/LABA and call us back for new Rx.  Pt agrees with plan and has no further questions at this time.

## 2018-10-10 DIAGNOSIS — J44.9 COPD (CHRONIC OBSTRUCTIVE PULMONARY DISEASE) (H): Primary | ICD-10-CM

## 2018-10-10 NOTE — TELEPHONE ENCOUNTER
[facility]    Phone Message    May a detailed message be left on voicemail: yes    Reason for Call: Other: Pt spoke to insurance and would like a prescription for Advair Discis, pt believes he was taking this before and this worked, make sure it's discis, pt thinks this medication should be in his file from previous use, please call with questions     Action Taken: Message routed to:  [facility]

## 2018-10-16 LAB
DLCOUNC-%PRED-PRE: 63 %
DLCOUNC-PRE: 17.91 ML/MIN/MMHG
DLCOUNC-PRED: 28.02 ML/MIN/MMHG
ERV-%PRED-PRE: 108 %
ERV-PRE: 1.95 L
ERV-PRED: 1.8 L
EXPTIME-PRE: 13.41 SEC
FEF2575-%PRED-PRE: 18 %
FEF2575-PRE: 0.51 L/SEC
FEF2575-PRED: 2.75 L/SEC
FEFMAX-%PRED-PRE: 40 %
FEFMAX-PRE: 3.71 L/SEC
FEFMAX-PRED: 9.04 L/SEC
FEV1-%PRED-PRE: 48 %
FEV1-PRE: 1.59 L
FEV1FEV6-PRE: 49 %
FEV1FEV6-PRED: 79 %
FEV1FVC-PRE: 42 %
FEV1FVC-PRED: 75 %
FEV1SVC-PRE: 40 %
FEV1SVC-PRED: 67 %
FIFMAX-PRE: 5.72 L/SEC
FVC-%PRED-PRE: 90 %
FVC-PRE: 3.79 L
FVC-PRED: 4.17 L
IC-%PRED-PRE: 66 %
IC-PRE: 2.04 L
IC-PRED: 3.08 L
VA-%PRED-PRE: 78 %
VA-PRE: 5.27 L
VC-%PRED-PRE: 81 %
VC-PRE: 4 L
VC-PRED: 4.88 L

## 2019-03-13 ENCOUNTER — DOCUMENTATION ONLY (OUTPATIENT)
Dept: CARE COORDINATION | Facility: CLINIC | Age: 64
End: 2019-03-13

## 2019-03-19 ENCOUNTER — OFFICE VISIT (OUTPATIENT)
Dept: INTERNAL MEDICINE | Facility: CLINIC | Age: 64
End: 2019-03-19
Payer: COMMERCIAL

## 2019-03-19 VITALS
DIASTOLIC BLOOD PRESSURE: 79 MMHG | BODY MASS INDEX: 18.5 KG/M2 | HEART RATE: 74 BPM | WEIGHT: 129.2 LBS | SYSTOLIC BLOOD PRESSURE: 123 MMHG | HEIGHT: 70 IN | OXYGEN SATURATION: 98 % | RESPIRATION RATE: 16 BRPM

## 2019-03-19 DIAGNOSIS — N52.9 ERECTILE DYSFUNCTION, UNSPECIFIED ERECTILE DYSFUNCTION TYPE: ICD-10-CM

## 2019-03-19 DIAGNOSIS — Z13.6 SCREENING FOR AAA (ABDOMINAL AORTIC ANEURYSM): Primary | ICD-10-CM

## 2019-03-19 DIAGNOSIS — I25.10 ASCVD (ARTERIOSCLEROTIC CARDIOVASCULAR DISEASE): ICD-10-CM

## 2019-03-19 DIAGNOSIS — Z23 NEED FOR VACCINATION: ICD-10-CM

## 2019-03-19 LAB
ANION GAP SERPL CALCULATED.3IONS-SCNC: 4 MMOL/L (ref 3–14)
BUN SERPL-MCNC: 13 MG/DL (ref 7–30)
CALCIUM SERPL-MCNC: 8.8 MG/DL (ref 8.5–10.1)
CHLORIDE SERPL-SCNC: 104 MMOL/L (ref 94–109)
CHOLEST SERPL-MCNC: 200 MG/DL
CO2 SERPL-SCNC: 29 MMOL/L (ref 20–32)
CREAT SERPL-MCNC: 0.74 MG/DL (ref 0.66–1.25)
GFR SERPL CREATININE-BSD FRML MDRD: >90 ML/MIN/{1.73_M2}
GLUCOSE SERPL-MCNC: 100 MG/DL (ref 70–99)
HDLC SERPL-MCNC: 86 MG/DL
LDLC SERPL CALC-MCNC: 103 MG/DL
NONHDLC SERPL-MCNC: 113 MG/DL
POTASSIUM SERPL-SCNC: 4.6 MMOL/L (ref 3.4–5.3)
SODIUM SERPL-SCNC: 137 MMOL/L (ref 133–144)
TRIGL SERPL-MCNC: 50 MG/DL

## 2019-03-19 RX ORDER — SILDENAFIL 100 MG/1
100 TABLET, FILM COATED ORAL DAILY PRN
Qty: 15 TABLET | Refills: 3 | Status: SHIPPED | OUTPATIENT
Start: 2019-03-19 | End: 2019-03-20

## 2019-03-19 ASSESSMENT — MIFFLIN-ST. JEOR: SCORE: 1389.36

## 2019-03-19 ASSESSMENT — PAIN SCALES - GENERAL: PAINLEVEL: NO PAIN (0)

## 2019-03-19 NOTE — NURSING NOTE
Chief Complaint   Patient presents with     Physical     pt is here for an annual physical       Sherly Carrion CMA at 7:30 AM on 3/19/2019

## 2019-03-19 NOTE — PROGRESS NOTES
PRIMARY CARE CLINIC    Resident Clinic Note        Visit Date: March 19, 2019    Blayne Gonzalez  MRN: [id number]  YOB: 1955    HPI:  Blayne Gonzalez is a 63 year old male with COPD presenting for annual physical.    He has no physical complaints today.    COPD under control without recent exacerbation or need for albuterol use. 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    Interested in having viagra renewed since it's generic. It has been effective in the past without side effects.     ROS:  10 point ROS was reviewed and negative other than stated in HPI, including depression, fevers/chills/weight loss, urinary symptoms, exertional chest pain or dyspnea      Past medical history reviewed.    Past Medical History:   Diagnosis Date     COPD (chronic obstructive pulmonary disease) (H)      Pulmonary nodule        No Known Allergies    Past Surgical History:   Procedure Laterality Date     cyst removed from nipple         No family history on file.    Social History     Socioeconomic History     Marital status: Single     Spouse name: Not on file     Number of children: Not on file     Years of education: Not on file     Highest education level: Not on file   Occupational History     Not on file   Social Needs     Financial resource strain: Not on file     Food insecurity:     Worry: Not on file     Inability: Not on file     Transportation needs:     Medical: Not on file     Non-medical: Not on file   Tobacco Use     Smoking status: Former Smoker     Packs/day: 0.50     Years: 38.00     Pack years: 19.00     Types: Cigarettes     Last attempt to quit: 10/5/2015     Years since quitting: 3.4     Smokeless tobacco: Never Used     Tobacco comment: cutting down   Substance and Sexual Activity     Alcohol use: Yes     Alcohol/week: 0.0 oz     Comment: one beer every few months     Drug use: Yes     Comment: history  of marijuana use within the year     Sexual activity: Yes     Partners: Female   Lifestyle  "    Physical activity:     Days per week: Not on file     Minutes per session: Not on file     Stress: Not on file   Relationships     Social connections:     Talks on phone: Not on file     Gets together: Not on file     Attends Yazidism service: Not on file     Active member of club or organization: Not on file     Attends meetings of clubs or organizations: Not on file     Relationship status: Not on file     Intimate partner violence:     Fear of current or ex partner: Not on file     Emotionally abused: Not on file     Physically abused: Not on file     Forced sexual activity: Not on file   Other Topics Concern     Parent/sibling w/ CABG, MI or angioplasty before 65F 55M? Not Asked   Social History Narrative    The patient has a 40 pk yr tobacco hx.  He has no ongoing active use.  Alcohol use is 1 alcoholic drinks per week.  He does use of occasional marijuana.          He is employed as a hernandez and builder. .          The patient is single.  Has 0 children.           Current Outpatient Medications   Medication     albuterol (PROAIR HFA, PROVENTIL HFA, VENTOLIN HFA) 108 (90 BASE) MCG/ACT inhaler     albuterol (PROAIR HFA/PROVENTIL HFA/VENTOLIN HFA) 108 (90 Base) MCG/ACT inhaler     dextromethorphan-guaiFENesin (MUCINEX DM)  MG per 12 hr tablet     fluticasone-salmeterol (ADVAIR) 100-50 MCG/DOSE diskus inhaler     ibuprofen (ADVIL,MOTRIN) 600 MG tablet     sildenafil (VIAGRA) 100 MG tablet     sildenafil (VIAGRA) 100 MG tablet     No current facility-administered medications for this visit.        Vitals:  /79   Pulse 74   Resp 16   Ht 1.765 m (5' 9.5\")   Wt 58.6 kg (129 lb 3.2 oz)   SpO2 98%   BMI 18.81 kg/m      Physical Exam:  General: NAD  HEENT: Oral mucosa moist and non-erythematous, PERRLA, EOM intact  CV: RRR, normal S1S2, no m/c/r  Resp: Clear to auscultation bilaterally, no wheezes or crackles  Abd: Soft, non-tender, BS+, no masses appreciated  Extremities: WWP, no pedal " edema  Neuro: AAOx3, no lateralizing symptoms or focal neurologic deficits    Assessment:  62 yo male with COPD presenting for annual physical    Plan:  #COPD: under control, continue current inhalers    #Pulmonary nodules: has not had follow up CT scan since 2012, he would like to defer this discussion further until he follows up with pulmonology     #ED: renew viagra    #Screening tests:  -AAA ultrasound  -Discussed risks benefits of PSA, he would like to defer this as well  -lipid panel, bmp  -states he completed a fecal kit 4 months ago that was negative, do not see this on file    Health Maintenance: Tdap today    Follow-up: 1 year    Patient seen and discussed with Dr. Joe.     Cristopher Catherine MD, JOSE  PGY-3, Internal Medicine   [phone]    Pt was seen and examined with Dr. Catherine.  I agree with his documentation as noted above.    My additional comments: None    Kassie Joe MD

## 2019-03-19 NOTE — PATIENT INSTRUCTIONS
Primary Care Center Phone Number [phone]  Primary Care Center Medication Refill Request Information:  * Please contact your pharmacy regarding ANY request for medication refills.  ** [facility] Prescription Fax = [phone]  * Please allow 3 business days for routine medication refills.  * Please allow 5 business days for controlled substance medication refills.     Primary Care Center Test Result notification information:  *You will be notified with in 7-10 days of your appointment day regarding the results of your test.  If you are on MyChart you will be notified as soon as the provider has reviewed the results and signed off on them.               [facility]         Internal Medicine Resident                   Continuity Clinic    Who We Are    Resident Continuity Clinic is a part of the [facility] Primary Care Clinic.  Resident physicians see patients independently and establish a relationship with them over the course of their three-year residency program.  As with the Primary Care Clinic, our Resident Continuity Clinic models a group practice.  If your doctor is not available, you will be able to see another resident physician.  At the end of a resident s training, patients will be transitioned to a new resident physician for ongoing care.     We treat patients with a wide array of medical needs from routine physicals, to acute illnesses, to diabetes and blood pressure management, to complex medical illness.  What is a Resident Physician?    Resident physicians hold medical degrees and are doctors. They are training to become specialists in Internal Medicine. They work under the supervision of board-certified faculty physicians.  Expectations for Your Care    We strive to provide accessible, quality care at all times.    In order to provide this care, it is best to see your primary care resident doctor consistently rather switching between providers.  In the event you do see another physician, you  should schedule a follow-up visit with your usual primary care doctor.    If you are transitioning your care from another clinic, it is helpful to have your records available for your doctor to review.    We do not prescribe controlled substances, such as ADD medications or narcotic pain medications, on your first visit.  We will review your health records and concerns prior to devising a treatment plan with you in order to provide the best care.      Clinic Services     Extended clinic hours; patient  to help navigate your visit;  parking; laboratory and imaging services with evening and weekend hours    Multiple medical and surgical specialties in one building    Complementary services, including Nutrition, Integrative Medicine, Pharmacy consultations, Mental and Behavioral Health, Sports Medicine and Physical Therapy    Thank You    We would like to thank you for choosing the [facility] Internal Medicine Resident Continuity Clinic for your primary care. You are making a priceless contribution to the training of the next generation of health care practitioners.     Contact us at [phone] for appointments or questions.    Resident Clinic Hours are Tuesdays and Thursdays, 7:30am-5:00pm    Residents   Juan Oconnell MD  (Male)   Christal Kasper MD (Female)  Sherly Marie MD   (Female)   Elise Long MD   (Female)   Carlos Bennett MD  (Male)   Seferino Molina MD  (Male)   Say Saeed MD    (Female)   Seth Del Angel MD  (Male)   Louie Rubio MD  (Male)    Lamar Haskins MD  (Female)   Cristopher Catherine MD  (Male)   Candace Kaiser MD  (Female)   Chetan West MD   (Female)   Donal Astorga MD  (Male)   Krzysztof Figueredo MD  (Male)   Seferino Dorado MD (Male)   Mike Schuler MD  (Male)   Marsha Velasquez MD (Female)    Barbara Montes MD (Female)   Candy Sr MD  (Male)   Sharon Devi MD    (Female)   Yasmeen Hurley MD  (Female)    Supervising  Physicians   MD Nancy Bauman, MD Naveed Welsh, MD Hiram Niteo, MD Juliette Ceron, MD Mary Prasad, MD Isaiah Odonnell, MD Kassie Conroy MD

## 2019-03-20 ENCOUNTER — TELEPHONE (OUTPATIENT)
Dept: INTERNAL MEDICINE | Facility: CLINIC | Age: 64
End: 2019-03-20

## 2019-03-20 DIAGNOSIS — N52.9 ERECTILE DYSFUNCTION, UNSPECIFIED ERECTILE DYSFUNCTION TYPE: ICD-10-CM

## 2019-03-20 RX ORDER — SILDENAFIL 100 MG/1
100 TABLET, FILM COATED ORAL DAILY PRN
Qty: 15 TABLET | Refills: 3 | Status: SHIPPED | OUTPATIENT
Start: 2019-03-20 | End: 2019-05-10

## 2019-03-20 NOTE — TELEPHONE ENCOUNTER
[facility] Call Center    Phone Message    May a detailed message be left on voicemail: yes    Reason for Call: Other: Patient would like his prescription for sildenafil (VIAGRA) 100 MG tablet be sent to [facility] at [address].  He checked and the cost to fill it here is way cheaper than [facility].      Action Taken: Message routed to:  [facility] Primary Care

## 2019-03-27 ENCOUNTER — ANCILLARY PROCEDURE (OUTPATIENT)
Dept: ULTRASOUND IMAGING | Facility: CLINIC | Age: 64
End: 2019-03-27
Attending: INTERNAL MEDICINE
Payer: COMMERCIAL

## 2019-03-27 DIAGNOSIS — Z13.6 SCREENING FOR AAA (ABDOMINAL AORTIC ANEURYSM): ICD-10-CM

## 2019-03-28 DIAGNOSIS — I71.40 ABDOMINAL AORTIC ANEURYSM (AAA) WITHOUT RUPTURE (H): Primary | ICD-10-CM

## 2019-05-03 DIAGNOSIS — J44.9 COPD (CHRONIC OBSTRUCTIVE PULMONARY DISEASE) (H): ICD-10-CM

## 2019-05-03 NOTE — TELEPHONE ENCOUNTER
[facility] Call [facility]    Phone Message    May a detailed message be left on voicemail: yes    Reason for Call: Other: Pt requested a message be sent over regarding his script of fluticasone-salmeterol. Pt would like that refill as soon as possible     Action Taken: Message routed to:  [facility] pulm

## 2019-05-10 ENCOUNTER — TELEPHONE (OUTPATIENT)
Dept: INTERNAL MEDICINE | Facility: CLINIC | Age: 64
End: 2019-05-10

## 2019-05-10 DIAGNOSIS — N52.9 ERECTILE DYSFUNCTION, UNSPECIFIED ERECTILE DYSFUNCTION TYPE: ICD-10-CM

## 2019-05-10 RX ORDER — SILDENAFIL 100 MG/1
100 TABLET, FILM COATED ORAL DAILY PRN
Qty: 15 TABLET | Refills: 9 | Status: SHIPPED | OUTPATIENT
Start: 2019-05-10 | End: 2020-02-13

## 2019-05-10 NOTE — TELEPHONE ENCOUNTER
Patient was reached by phone and RN relayed that Rx was sent to pharmacy for 10 months supply, as last office visit was in March 2019.    Zoey Mcguire RN on 5/10/2019 at 12:14 PM

## 2019-05-10 NOTE — TELEPHONE ENCOUNTER
[facility] Call Center    Phone Message    May a detailed message be left on voicemail: yes    Reason for Call: Other: pt would like to change pharmacy to [facility] for RX's moving forward and pt would also like an extended RX for V for at least a year.  pt would like a call when complete.     Action Taken: Message routed to:  [facility] dayana

## 2019-05-10 NOTE — TELEPHONE ENCOUNTER
Pt requesting sildenafil refill be sent into [facility]. Instructed pt to contact pharmacy to have other Rx's transferred. Pt states understanding. Sherly Carrion CMA at 9:27 AM on 5/10/2019

## 2019-07-24 ENCOUNTER — ANCILLARY PROCEDURE (OUTPATIENT)
Dept: GENERAL RADIOLOGY | Facility: CLINIC | Age: 64
End: 2019-07-24
Payer: OTHER MISCELLANEOUS

## 2019-07-24 ENCOUNTER — OFFICE VISIT (OUTPATIENT)
Dept: ORTHOPEDICS | Facility: CLINIC | Age: 64
End: 2019-07-24
Payer: OTHER MISCELLANEOUS

## 2019-07-24 VITALS — WEIGHT: 126 LBS | RESPIRATION RATE: 16 BRPM | HEIGHT: 70 IN | BODY MASS INDEX: 18.04 KG/M2

## 2019-07-24 DIAGNOSIS — M54.16 RIGHT LUMBAR RADICULOPATHY: Primary | ICD-10-CM

## 2019-07-24 DIAGNOSIS — M54.5 LOW BACK PAIN, UNSPECIFIED BACK PAIN LATERALITY, UNSPECIFIED CHRONICITY, WITH SCIATICA PRESENCE UNSPECIFIED: Primary | ICD-10-CM

## 2019-07-24 DIAGNOSIS — M54.5 LOW BACK PAIN, UNSPECIFIED BACK PAIN LATERALITY, UNSPECIFIED CHRONICITY, WITH SCIATICA PRESENCE UNSPECIFIED: ICD-10-CM

## 2019-07-24 RX ORDER — NAPROXEN 500 MG/1
500 TABLET ORAL 2 TIMES DAILY WITH MEALS
Qty: 28 TABLET | Refills: 0 | Status: SHIPPED | OUTPATIENT
Start: 2019-07-24

## 2019-07-24 RX ORDER — GABAPENTIN 300 MG/1
300 CAPSULE ORAL
Qty: 30 CAPSULE | Refills: 1 | Status: SHIPPED | OUTPATIENT
Start: 2019-07-24

## 2019-07-24 ASSESSMENT — MIFFLIN-ST. JEOR: SCORE: 1374.84

## 2019-07-24 NOTE — PATIENT INSTRUCTIONS
Let's try an anti-inflammatory medicine (Naproxen) twice daily for two weeks.    You can also try the gabapentin an hour before bedtime to help with sleep.     Schedule a PT visit.     We'll plan to see you back to clinic in 3 weeks, but call before then if you're not improving at all so we can get an MRI beforehand.

## 2019-07-24 NOTE — LETTER
"  7/24/2019      RE: Blayne Gonzalez  [address]  [facility] [address]        Subjective:   Blayne Gonzalez is a 63 year old male who presents with back pain. Work comp visit.      He is a . Taking out an air conditioner and \"tweaked\" his back. Took two days off due to this shortly after injury, otherwise has continued to work despite sx. He notes radiating pain around right hip to anterior right thigh. 5/10 pain, sometimes hard to get to sleep at night. Has had some gradual improvement in pain but still bothersome. One episode of back pain over 20 years ago, resolved quickly wo any chronic sx. Denies any sensory changes, focal weakness, bowel/bladder changes, hematuria or obstructive sx.    Background:   Date of injury: 6/7/19   Duration of symptoms: 1.5 months  Mechanism of Injury: Acute; Activity Related   Aggravating factors: pain at night, sitting  Relieving Factors: ice and self message, lumbar brace, TENS  Prior Evaluation: None    PAST MEDICAL, SOCIAL, SURGICAL AND FAMILY HISTORY: He  has a past medical history of COPD (chronic obstructive pulmonary disease) (H) and Pulmonary nodule. He also has no past medical history of Arthritis.  He  has a past surgical history that includes cyst removed from nipple.  His family history is not on file.  He reports that he quit smoking about 3 years ago. His smoking use included cigarettes. He has a 19.00 pack-year smoking history. He has never used smokeless tobacco. He reports that he drinks alcohol. He reports that he has current or past drug history.    ALLERGIES: He has No Known Allergies.    CURRENT MEDICATIONS: He has a current medication list which includes the following prescription(s): albuterol, albuterol, dextromethorphan-guaifenesin, fluticasone-salmeterol, ibuprofen, and sildenafil.     REVIEW OF SYSTEMS: 9 point review of systems is negative except as noted above.     Exam:    Resp 16   Ht 1.765 m (5' 9.5\")   Wt 57.2 kg (126 " lb)   BMI 18.34 kg/m       CONSTITUTIONAL: alert, thin habitus, no distress  HEAD: NCAT  SKIN: no suspicious lesions or rashes observed  PSYCHIATRIC: affect normal/bright and mentation appears normal.    GAIT: normal  LUMBAR: well healed surgical scar. No swelling or erythema. No midline tenderness or stepoff. Right paraspinous muscle tenderness and spasm. Nttp SI joints bilateral. Failure to reverse lumbar lordosis noted. Mild discomfort w SLR, -contralat, negative slump.   FHL 5-/5 on R, otherwise EHL, knee flexion/ext strength 5/5 and symmetric. SILT throughout BLE. Palpable PT and DP pulses.        IMAGING:  X-ray Lumbar Spine 2-3 Views Result Date: 7/24/2019  FINDINGS: There 5 lumbar type vertebral bodies for the purposes of this dictation, assuming hypoplastic ribs at T12. Multilevel disc space narrowing in the lumbar spine, greatest at L4-L5 and L5-S1. No compression fractures.     IMPRESSION: Multilevel disc space narrowing in the lumbar spine, greatest at L4-L5 and L5-S1.       Assessment/Plan:   1. Right lumbar radiculopathy  Xrays reviewed with patient, consistent with chronic multilevel disc degeneration and probable S1 nerve root irritation. Discussed treatment options with patient and is agreeable to trial of conservative treatment with naproxen BID x2 weeks and physical therapy. Also may try gabapentin at bedtime PRN to help him get better sleep. Letter for work restrictions written, lifting max 40 lbs. Patient to follow up in 3-4 weeks, sooner if needed. Will consider MRI at that time if patient is not improving.  - PHYSICAL THERAPY REFERRAL (External-Prints); Future  - naproxen (NAPROSYN) 500 MG tablet; Take 1 tablet (500 mg) by mouth 2 times daily (with meals)  Dispense: 28 tablet; Refill: 0  - gabapentin (NEURONTIN) 300 MG capsule; Take 1 capsule (300 mg) by mouth nightly as needed (back spasm)  Dispense: 30 capsule; Refill: 1    Seen and discussed with Dr. Ahn.    Stephen Thompson MD  Primary  Care Sports Medicine Fellow     Attending Note:   I have personally examined this patient and have reviewed the clinical presentation and progress note with the fellow. I agree with the treatment plan as outlined. The plan was formulated with the fellow on the day of the patient's visit. I have reviewed all imaging with the fellow and agree with the findings in the documentation.     Crystal Ahn MD, CAQ, CCD  [facility]  Sports Medicine and Bone Health    Crystal Ahn MD

## 2019-07-24 NOTE — LETTER
July 24, 2019      Blayne Gonzalez  [address]   [place] [address]        To Whom It May Concern:    Blayne Gonzalez was seen in our clinic. He may return to work with the following: Limited to no lifting more than 40 lbs. We will revisit this at his next office visit in approximately 3 weeks.       Sincerely,        Stephen Thompson MD

## 2019-07-24 NOTE — PROGRESS NOTES
" Subjective:   Blayne Gonzalez is a 63 year old male who presents with back pain. Work comp visit.      He is a . Taking out an air conditioner and \"tweaked\" his back. Took two days off due to this shortly after injury, otherwise has continued to work despite sx. He notes radiating pain around right hip to anterior right thigh. 5/10 pain, sometimes hard to get to sleep at night. Has had some gradual improvement in pain but still bothersome. One episode of back pain over 20 years ago, resolved quickly wo any chronic sx. Denies any sensory changes, focal weakness, bowel/bladder changes, hematuria or obstructive sx.    Background:   Date of injury: 6/7/19   Duration of symptoms: 1.5 months  Mechanism of Injury: Acute; Activity Related   Aggravating factors: pain at night, sitting  Relieving Factors: ice and self message, lumbar brace, TENS  Prior Evaluation: None    PAST MEDICAL, SOCIAL, SURGICAL AND FAMILY HISTORY: He  has a past medical history of COPD (chronic obstructive pulmonary disease) (H) and Pulmonary nodule. He also has no past medical history of Arthritis.  He  has a past surgical history that includes cyst removed from nipple.  His family history is not on file.  He reports that he quit smoking about 3 years ago. His smoking use included cigarettes. He has a 19.00 pack-year smoking history. He has never used smokeless tobacco. He reports that he drinks alcohol. He reports that he has current or past drug history.    ALLERGIES: He has No Known Allergies.    CURRENT MEDICATIONS: He has a current medication list which includes the following prescription(s): albuterol, albuterol, dextromethorphan-guaifenesin, fluticasone-salmeterol, ibuprofen, and sildenafil.     REVIEW OF SYSTEMS: 9 point review of systems is negative except as noted above.     Exam:    Resp 16   Ht 1.765 m (5' 9.5\")   Wt 57.2 kg (126 lb)   BMI 18.34 kg/m      CONSTITUTIONAL: alert, thin habitus, no distress  HEAD: " NCAT  SKIN: no suspicious lesions or rashes observed  PSYCHIATRIC: affect normal/bright and mentation appears normal.    GAIT: normal  LUMBAR: well healed surgical scar. No swelling or erythema. No midline tenderness or stepoff. Right paraspinous muscle tenderness and spasm. Nttp SI joints bilateral. Failure to reverse lumbar lordosis noted. Mild discomfort w SLR, -contralat, negative slump.   FHL 5-/5 on R, otherwise EHL, knee flexion/ext strength 5/5 and symmetric. SILT throughout BLE. Palpable PT and DP pulses.        IMAGING:  X-ray Lumbar Spine 2-3 Views Result Date: 7/24/2019  FINDINGS: There 5 lumbar type vertebral bodies for the purposes of this dictation, assuming hypoplastic ribs at T12. Multilevel disc space narrowing in the lumbar spine, greatest at L4-L5 and L5-S1. No compression fractures.     IMPRESSION: Multilevel disc space narrowing in the lumbar spine, greatest at L4-L5 and L5-S1.       Assessment/Plan:   1. Right lumbar radiculopathy  Xrays reviewed with patient, consistent with chronic multilevel disc degeneration and probable S1 nerve root irritation. Discussed treatment options with patient and is agreeable to trial of conservative treatment with naproxen BID x2 weeks and physical therapy. Also may try gabapentin at bedtime PRN to help him get better sleep. Letter for work restrictions written, lifting max 40 lbs. Patient to follow up in 3-4 weeks, sooner if needed. Will consider MRI at that time if patient is not improving.  - PHYSICAL THERAPY REFERRAL (External-Prints); Future  - naproxen (NAPROSYN) 500 MG tablet; Take 1 tablet (500 mg) by mouth 2 times daily (with meals)  Dispense: 28 tablet; Refill: 0  - gabapentin (NEURONTIN) 300 MG capsule; Take 1 capsule (300 mg) by mouth nightly as needed (back spasm)  Dispense: 30 capsule; Refill: 1    Seen and discussed with Dr. Ahn.    Stephen Thompson MD  Primary Care Sports Medicine Fellow

## 2019-07-25 NOTE — PROGRESS NOTES
Attending Note:   I have personally examined this patient and have reviewed the clinical presentation and progress note with the fellow. I agree with the treatment plan as outlined. The plan was formulated with the fellow on the day of the patient's visit. I have reviewed all imaging with the fellow and agree with the findings in the documentation.     Crystal Ahn MD, CAQ, CCD  [facility]  Sports Medicine and Bone Health

## 2019-07-26 ENCOUNTER — THERAPY VISIT (OUTPATIENT)
Dept: PHYSICAL THERAPY | Facility: CLINIC | Age: 64
End: 2019-07-26
Payer: OTHER MISCELLANEOUS

## 2019-07-26 DIAGNOSIS — M54.16 RIGHT LUMBAR RADICULOPATHY: ICD-10-CM

## 2019-07-26 DIAGNOSIS — M54.41 ACUTE RIGHT-SIDED LOW BACK PAIN WITH RIGHT-SIDED SCIATICA: ICD-10-CM

## 2019-07-26 PROCEDURE — 97161 PT EVAL LOW COMPLEX 20 MIN: CPT | Mod: GP | Performed by: PHYSICAL THERAPIST

## 2019-07-26 PROCEDURE — 97530 THERAPEUTIC ACTIVITIES: CPT | Mod: GP | Performed by: PHYSICAL THERAPIST

## 2019-07-26 PROCEDURE — 97110 THERAPEUTIC EXERCISES: CPT | Mod: GP | Performed by: PHYSICAL THERAPIST

## 2019-07-30 DIAGNOSIS — J43.9 PULMONARY EMPHYSEMA, UNSPECIFIED EMPHYSEMA TYPE (H): ICD-10-CM

## 2019-07-30 RX ORDER — ALBUTEROL SULFATE 90 UG/1
2 AEROSOL, METERED RESPIRATORY (INHALATION) EVERY 6 HOURS PRN
Qty: 3 INHALER | Refills: 0 | Status: SHIPPED | OUTPATIENT
Start: 2019-07-30 | End: 2019-10-07

## 2019-07-30 NOTE — TELEPHONE ENCOUNTER
Refilled Albuterol for patient under consult provider Dr Herrera per Dr Arguello as Dr Brannon has not resumed his clinic as yet as Staff provider.

## 2019-07-30 NOTE — TELEPHONE ENCOUNTER
[facility]    Phone Message    May a detailed message be left on voicemail: yes    Reason for Call: Medication Refill Request    Has the patient contacted the pharmacy for the refill? Yes   Name of medication being requested: albuterol (PROAIR HFA/PROVENTIL HFA/VENTOLIN HFA) 108 (90 Base) MCG/ACT inhaler  Provider who prescribed the medication: Raymond Brannon MD  Pharmacy:    [id number] [address] AT [facility] & [address] [place] [address]    Date medication is needed: ASAP  Pt is requesting a 3 month supply because he gets better pricing for this through his insurance.. He is totally out of his inhaler.     Action Taken: Message routed to:  [facility] Pulmonary

## 2019-08-02 ENCOUNTER — THERAPY VISIT (OUTPATIENT)
Dept: PHYSICAL THERAPY | Facility: CLINIC | Age: 64
End: 2019-08-02
Payer: OTHER MISCELLANEOUS

## 2019-08-02 DIAGNOSIS — M54.41 ACUTE RIGHT-SIDED LOW BACK PAIN WITH RIGHT-SIDED SCIATICA: ICD-10-CM

## 2019-08-02 PROCEDURE — 97110 THERAPEUTIC EXERCISES: CPT | Mod: GP | Performed by: PHYSICAL THERAPIST

## 2019-08-02 PROCEDURE — 97112 NEUROMUSCULAR REEDUCATION: CPT | Mod: GP | Performed by: PHYSICAL THERAPIST

## 2019-08-05 NOTE — PROGRESS NOTES
"[facility] Initial Evaluation  Subjective:  Physical Therapy Initial Examination/Evaluation  July 26, 2019    Blayne Gonzalez  is a 63 year old  male referred to physical therapy by Dr. Ahn for treatment of his low back.  Blayne has a referral diagnosis of low back pain. Differential diagnoses includes lumbar facet arthropathy and lumbar disc HNP.  He has a history of low back problems going back about 20 years, but believes his last episode was about 10 years ago.    DOI/onset 6-7-19  Mechanism of injury patient was installing an air conditioning unit in an apartment complex and \"tweaked\" his back.    Prior treatment no clinical care for his low back subsequent to his 6-7-19 injury. He has been using TENS, brace, ice, and massage with some effect. Effect of prior treatment patient reports he is 70-80% improved from his initial status    Chief Complaint:   Low back pain, primarily right sided, with radiating pain into the right thigh to the knee. His symptoms interfere with sleep, daily work duties and sitting activities.      Symptoms have been slowly improving since onset.    Current pain 6/10.  Pain at best 2/10.  Pain at worst 8/10.    Symptoms aggravated by sitting, lifting, sleep position.    Symptoms improved with cold/ice, TENS, back brace, massage.       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 Currently working but self limits duties.  Job duties:  Facility maintenence.    Patient having difficulty with ADLs: lifting, sitting, sleep.    Patient's goals are resolve symptoms, return to unrestricted ADL's.    Patient reports general health as good. EHR was reviewed for health history, medication, imaging, surgery.     Outcome measure:   Oswestry 18/50 for a score of 36%. Roe total score 3, sub score of 1, placing him in the \"low risk\" category  Return to MD:  8-15-19.              Oswestry Score: 36 %                 Objective:        Flexibility/Screens: " "      Lower Extremity:  Decreased left lower extremity flexibility:Hamstrings    Decreased right lower extremity flexibility:  Hamstrings               Lumbar/SI Evaluation  ROM:    AROM Lumbar:   Flexion:            50% with increased back/leg sxs  Ext:                    100% - \"feels good\"   Side Bend:        Left:  75%    Right:  95%  Rotation:           Left:  100%    Right:  100%  Side Glide:        Left:     Right:         Strength: preliminary abdominal testing provoked sxs. Reassess when less symptomatic  Lumbar Myotomes:  normal            Lumbar DTR's:  normal        Lumbar Dermtomes:  normal                Neural Tension/Mobility:  Neural tension wnl lumbar: mild provocation with sciatic tensioning.                                                             General     ROS    Assessment/Plan:    Patient is a 63 year old male with lumbar complaints.    Patient has the following significant findings with corresponding treatment plan.                Diagnosis 1:  LBP, likely lumbar disc HNP    Pain -  hot/cold therapy, manual therapy, self management, education, directional preference exercise and home program  Decreased ROM/flexibility - manual therapy and therapeutic exercise  Decreased strength - therapeutic exercise  Decreased function - therapeutic activities    Therapy Evaluation Codes:   1) History comprised of:   Personal factors that impact the plan of care:      None.    Comorbidity factors that impact the plan of care are:      None.     Medications impacting care: None.  2) Examination of Body Systems comprised of:   Body structures and functions that impact the plan of care:      Lumbar spine.   Activity limitations that impact the plan of care are:      Bending, Lifting, Sitting and Sleeping.  3) Clinical presentation characteristics are:   Stable/Uncomplicated.  4) Decision-Making    Low complexity using standardized patient assessment instrument and/or measureable assessment of functional " outcome.  Cumulative Therapy Evaluation is: Low complexity.    Previous and current functional limitations:  (See Goal Flow Sheet for this information)    Short term and Long term goals: (See Goal Flow Sheet for this information)     Communication ability:  Patient appears to be able to clearly communicate and understand verbal and written communication and follow directions correctly.  Treatment Explanation - The following has been discussed with the patient:   RX ordered/plan of care  Anticipated outcomes  Possible risks and side effects  This patient would benefit from PT intervention to resume normal activities.   Rehab potential is good.    Frequency:  1 X week, once daily  Duration:  for 6 weeks  Discharge Plan:  Achieve all LTG.  Independent in home treatment program.  Reach maximal therapeutic benefit.    Please refer to the daily flowsheet for treatment today, total treatment time and time spent performing 1:1 timed codes.

## 2019-08-07 ENCOUNTER — TELEPHONE (OUTPATIENT)
Dept: INTERNAL MEDICINE | Facility: CLINIC | Age: 64
End: 2019-08-07

## 2019-08-07 DIAGNOSIS — Z12.11 SPECIAL SCREENING FOR MALIGNANT NEOPLASMS, COLON: Primary | ICD-10-CM

## 2019-08-07 NOTE — TELEPHONE ENCOUNTER
[facility] Call Center    Phone Message    May a detailed message be left on voicemail: yes    Reason for Call: Order(s): Other:   Reason for requested: colonoscopy   Date needed: asap  Provider name: Dr Catherine      Action Taken: Message routed to:  [facility]

## 2019-08-07 NOTE — TELEPHONE ENCOUNTER
"I see no documentation about colonoscopy or other CRC screening test in past. Would recommend he schedule clinic visit to review options and pertinent medical history.  His insurance is listed as \"workers comp\" and I am not sure they would cover this test; he may want to check.  Thanks,  Juliette Ceron MD  Internal Medicine    "

## 2019-08-07 NOTE — TELEPHONE ENCOUNTER
Colonoscopy -Noted he needs every 10 years according to [facility]. Not due until 2028. Nothing in Chart except under Health Maintenance.  New MD is Dr. HIRA Larry  Did sample test, told by brother that fecal sample tests doesn't say if you have polyps.  No symptoms. Has never had one before.   Getting ready to retire and would like it done before he doesn't have his good insurance. I advised his Md was only in the clinic on Tuesdays and he wanted it sent to another provider to expedite it due to insurance and FDC. Will send to new provider and also Medical Director. Zaira Jenkins Paramedic on 8/7/2019 at 9:22 AM

## 2019-08-08 NOTE — TELEPHONE ENCOUNTER
Called the patient and advised he should make an appointment to discuss his history, medications and options in regards to the colonoscopy risk vs benefits.   He agreed and will call. Zaira Jenkins Paramedic on 8/8/2019 at 8:59 AM

## 2019-08-15 ENCOUNTER — OFFICE VISIT (OUTPATIENT)
Dept: ORTHOPEDICS | Facility: CLINIC | Age: 64
End: 2019-08-15
Payer: OTHER MISCELLANEOUS

## 2019-08-15 VITALS — BODY MASS INDEX: 18.04 KG/M2 | WEIGHT: 126 LBS | HEIGHT: 70 IN

## 2019-08-15 DIAGNOSIS — M54.16 RIGHT LUMBAR RADICULOPATHY: Primary | ICD-10-CM

## 2019-08-15 ASSESSMENT — MIFFLIN-ST. JEOR: SCORE: 1374.84

## 2019-08-15 ASSESSMENT — PAIN SCALES - GENERAL: PAINLEVEL: NO PAIN (1)

## 2019-08-15 NOTE — PROGRESS NOTES
" Subjective:   Blayne Gonzalez is a 63 year old male who presents with back pain. Work comp visit.      Feels 95% better. Small amount of pain in right low back radiating to right upper posterior leg, primarily in the morning. Everyday he feels better. Wearing a back brace. Has a Dr. Meehan's electrical stimulator that he uses on right hip, which he has not had to reactivate during the day for pain. Used up naproxen. Currently taking Tylenol 1000mg as needed once daily in the morning. Put a piece of plywood under mattress. Doing home stretches. Did 2 formal PT sessions then told he does not need to return. Continuing home exercises. Gabapentin helps at night. Wants to go back to full duty work.    Background:   Date of injury: 6/7/19   Duration of symptoms: 2 months  Mechanism of Injury: Acute; Activity Related   Aggravating factors: pain at night, sitting  Relieving Factors: ice and self message, lumbar brace, TENS  Prior Evaluation: None    PAST MEDICAL, SOCIAL, SURGICAL AND FAMILY HISTORY: He  has a past medical history of COPD (chronic obstructive pulmonary disease) (H) and Pulmonary nodule. He also has no past medical history of Arthritis.  He  has a past surgical history that includes cyst removed from nipple.  His family history is not on file.  He reports that he quit smoking about 3 years ago. His smoking use included cigarettes. He has a 19.00 pack-year smoking history. He has never used smokeless tobacco. He reports that he drinks alcohol. He reports that he has current or past drug history.    ALLERGIES: He has No Known Allergies.    CURRENT MEDICATIONS: He has a current medication list which includes the following prescription(s): albuterol, albuterol, dextromethorphan-guaifenesin, fluticasone-salmeterol, gabapentin, ibuprofen, naproxen, and sildenafil.     REVIEW OF SYSTEMS: 9 point review of systems is negative except as noted above.     Exam:    Ht 1.765 m (5' 9.5\")   Wt 57.2 kg (126 lb)   BMI 18.34 " kg/m      CONSTITUTIONAL: alert, thin habitus, no distress  HEAD: NC/AT  SKIN: no suspicious lesions or rashes observed  PSYCHIATRIC: affect normal/bright and mentation appears normal.  GAIT: normal  LUMBAR: well healed surgical scar. No swelling or erythema. No midline tenderness or stepoff. Non-tender paraspinals. Non-tender right SI. Negative SLR.  Neuro: L4 and S1 reflex 2+ bilaterally. 5/5 strength of right EHL, ankle plantarflexion, ankle dorsiflexion, knee flexion/ext strength; symmetric to the left. SILT throughout BLE.   CV: Palpable PT and DP pulses.       IMAGING:  X-ray Lumbar Spine 2-3 Views Result Date: 7/24/2019  FINDINGS: There 5 lumbar type vertebral bodies for the purposes of this dictation, assuming hypoplastic ribs at T12. Multilevel disc space narrowing in the lumbar spine, greatest at L4-L5 and L5-S1. No compression fractures.     IMPRESSION: Multilevel disc space narrowing in the lumbar spine, greatest at L4-L5 and L5-S1.       Assessment/Plan:   63-year-old male with:    1. Right lumbar radiculopathy  Improved, nearly resolved. Prior Xrays consistent with chronic multilevel disc degeneration and probable S1 nerve root irritation.  - note provided to return to full duty work  - Stop lumbar back brace use  - Continue acetaminophen prn, max 3000mg daily dosing  - Continue gabapentin 300mg nightly as needed    Follow-up as needed.    Seen and discussed with fellow Dr. Thompson and attending Dr. Ahn.    Jose L Calvert MD  PGY-3

## 2019-08-15 NOTE — LETTER
Date:August 16, 2019      Patient was self referred, no letter generated. Do not send.        [facility] Health Information

## 2019-08-15 NOTE — LETTER
8/15/2019      RE: Blayne Gonzalez  [address]  [place] [address]        Subjective:   Blayne Gonzalez is a 63 year old male who presents with back pain. Work comp visit.      Feels 95% better. Small amount of pain in right low back radiating to right upper posterior leg, primarily in the morning. Everyday he feels better. Wearing a back brace. Has a Dr. Meehan's electrical stimulator that he uses on right hip, which he has not had to reactivate during the day for pain. Used up naproxen. Currently taking Tylenol 1000mg as needed once daily in the morning. Put a piece of plywood under mattress. Doing home stretches. Did 2 formal PT sessions then told he does not need to return. Continuing home exercises. Gabapentin helps at night. Wants to go back to full duty work.    Background:   Date of injury: 6/7/19   Duration of symptoms: 2 months  Mechanism of Injury: Acute; Activity Related   Aggravating factors: pain at night, sitting  Relieving Factors: ice and self message, lumbar brace, TENS  Prior Evaluation: None    PAST MEDICAL, SOCIAL, SURGICAL AND FAMILY HISTORY: He  has a past medical history of COPD (chronic obstructive pulmonary disease) (H) and Pulmonary nodule. He also has no past medical history of Arthritis.  He  has a past surgical history that includes cyst removed from nipple.  His family history is not on file.  He reports that he quit smoking about 3 years ago. His smoking use included cigarettes. He has a 19.00 pack-year smoking history. He has never used smokeless tobacco. He reports that he drinks alcohol. He reports that he has current or past drug history.    ALLERGIES: He has No Known Allergies.    CURRENT MEDICATIONS: He has a current medication list which includes the following prescription(s): albuterol, albuterol, dextromethorphan-guaifenesin, fluticasone-salmeterol, gabapentin, ibuprofen, naproxen, and sildenafil.     REVIEW OF SYSTEMS: 9 point review of systems is negative  "except as noted above.     Exam:    Ht 1.765 m (5' 9.5\")   Wt 57.2 kg (126 lb)   BMI 18.34 kg/m       CONSTITUTIONAL: alert, thin habitus, no distress  HEAD: NC/AT  SKIN: no suspicious lesions or rashes observed  PSYCHIATRIC: affect normal/bright and mentation appears normal.  GAIT: normal  LUMBAR: well healed surgical scar. No swelling or erythema. No midline tenderness or stepoff. Non-tender paraspinals. Non-tender right SI. Negative SLR.  Neuro: L4 and S1 reflex 2+ bilaterally. 5/5 strength of right EHL, ankle plantarflexion, ankle dorsiflexion, knee flexion/ext strength; symmetric to the left. SILT throughout BLE.   CV: Palpable PT and DP pulses.       IMAGING:  X-ray Lumbar Spine 2-3 Views Result Date: 7/24/2019  FINDINGS: There 5 lumbar type vertebral bodies for the purposes of this dictation, assuming hypoplastic ribs at T12. Multilevel disc space narrowing in the lumbar spine, greatest at L4-L5 and L5-S1. No compression fractures.     IMPRESSION: Multilevel disc space narrowing in the lumbar spine, greatest at L4-L5 and L5-S1.       Assessment/Plan:   63-year-old male with:    1. Right lumbar radiculopathy  Improved, nearly resolved. Prior Xrays consistent with chronic multilevel disc degeneration and probable S1 nerve root irritation.  - note provided to return to full duty work  - Stop lumbar back brace use  - Continue acetaminophen prn, max 3000mg daily dosing  - Continue gabapentin 300mg nightly as needed    Follow-up as needed.    Seen and discussed with fellow Dr. Thompson and attending Dr. Ahn.    Jose L Calvert MD  PGY-3    Attending Note:   I have personally examined this patient and have reviewed the clinical presentation and progress note with the resident and fellow. I agree with the treatment plan as outlined. The plan was formulated with the resident and fellow on the day of the patient's visit. I have reviewed all imaging with the fellow and agree with the findings in the documentation. "     Crystal Ahn MD, CAQ, CCD  [facility]  Sports Medicine and Bone Health    Stephen Thompson MD

## 2019-08-15 NOTE — PROGRESS NOTES
Attending Note:   I have personally examined this patient and have reviewed the clinical presentation and progress note with the resident and fellow. I agree with the treatment plan as outlined. The plan was formulated with the resident and fellow on the day of the patient's visit. I have reviewed all imaging with the fellow and agree with the findings in the documentation.     Crystal Ahn MD, CAQ, CCD  [facility]  Sports Medicine and Bone Health

## 2019-08-15 NOTE — LETTER
[facility] SPORTS MEDICINE  [address]  [address]  [place] [address]  [phone]          August 15, 2019    RE:  Blayne Gonzalez                                                                                                                                                       [address]   [place] [address]            To whom it may concern:    Blayne Gonzalez is under my professional care a back injury and is now clear to return to his full duties.     Sincerely,        Stephen Thompson MD

## 2019-10-07 ENCOUNTER — OFFICE VISIT (OUTPATIENT)
Dept: PULMONOLOGY | Facility: CLINIC | Age: 64
End: 2019-10-07
Attending: INTERNAL MEDICINE
Payer: COMMERCIAL

## 2019-10-07 ENCOUNTER — ANCILLARY PROCEDURE (OUTPATIENT)
Dept: CT IMAGING | Facility: CLINIC | Age: 64
End: 2019-10-07
Attending: INTERNAL MEDICINE
Payer: COMMERCIAL

## 2019-10-07 VITALS
OXYGEN SATURATION: 97 % | DIASTOLIC BLOOD PRESSURE: 77 MMHG | HEART RATE: 101 BPM | SYSTOLIC BLOOD PRESSURE: 111 MMHG | BODY MASS INDEX: 18.6 KG/M2 | HEIGHT: 69 IN | WEIGHT: 125.6 LBS

## 2019-10-07 DIAGNOSIS — J42 CHRONIC BRONCHITIS, UNSPECIFIED CHRONIC BRONCHITIS TYPE (H): ICD-10-CM

## 2019-10-07 DIAGNOSIS — J44.1 COPD EXACERBATION (H): ICD-10-CM

## 2019-10-07 DIAGNOSIS — Z87.891 PERSONAL HISTORY OF TOBACCO USE: ICD-10-CM

## 2019-10-07 DIAGNOSIS — J43.2 CENTRILOBULAR EMPHYSEMA (H): Primary | ICD-10-CM

## 2019-10-07 PROCEDURE — 25000128 H RX IP 250 OP 636: Mod: ZF | Performed by: INTERNAL MEDICINE

## 2019-10-07 PROCEDURE — G0008 ADMIN INFLUENZA VIRUS VAC: HCPCS | Mod: ZF

## 2019-10-07 PROCEDURE — 90682 RIV4 VACC RECOMBINANT DNA IM: CPT | Mod: ZF | Performed by: INTERNAL MEDICINE

## 2019-10-07 PROCEDURE — G0463 HOSPITAL OUTPT CLINIC VISIT: HCPCS | Mod: ZF

## 2019-10-07 RX ORDER — ALBUTEROL SULFATE 90 UG/1
2 AEROSOL, METERED RESPIRATORY (INHALATION) EVERY 6 HOURS PRN
Qty: 1 INHALER | Refills: 11 | Status: SHIPPED | OUTPATIENT
Start: 2019-10-07 | End: 2022-06-17

## 2019-10-07 RX ADMIN — INFLUENZA A VIRUS A/BRISBANE/02/2018 (H1N1) RECOMBINANT HEMAGGLUTININ ANTIGEN, INFLUENZA A VIRUS A/KANSAS/14/2017 (H3N2) RECOMBINANT HEMAGGLUTININ ANTIGEN, INFLUENZA B VIRUS B/PHUKET/3073/2013 RECOMBINANT HEMAGGLUTININ ANTIGEN, AND INFLUENZA B VIRUS B/MARYLAND/15/2016 RECOMBINANT HEMAGGLUTININ ANTIGEN 0.5 ML: 45; 45; 45; 45 INJECTION INTRAMUSCULAR at 14:17

## 2019-10-07 ASSESSMENT — PAIN SCALES - GENERAL: PAINLEVEL: NO PAIN (0)

## 2019-10-07 ASSESSMENT — MIFFLIN-ST. JEOR: SCORE: 1365.1

## 2019-10-07 NOTE — NURSING NOTE
Chief Complaint   Patient presents with     Follow Up     1yr fu     Vitals were taken and medications were reconciled.     MARIUM Cisneros

## 2019-10-07 NOTE — PATIENT INSTRUCTIONS
1. Continue Advair   2. Continue albuterol   3. Flu shot today   4. Lung cancer screening CT scan     Return to clinic in 1 year    Lung Cancer Screening   Frequently Asked Questions  If you are at high-risk for lung cancer, getting screened with low-dose computed tomography (LDCT) every year can help save your life. This handout offers answers to some of the most common questions about lung cancer screening. If you have other questions, please call [phone] ([phone]).     What is it?  Lung cancer screening uses special X-ray technology to create an image of your lung tissue. The exam is quick and easy and takes less than 10 seconds. We don t give you any medicine or use any needles. You can eat before and after the exam. You don t need to change your clothes as long as the clothing on your chest doesn t contain metal. But, you do need to be able to hold your breath for at least 6 seconds during the exam.    What is the goal of lung cancer screening?  The goal of lung cancer screening is to save lives. Many times, lung cancer is not found until a person starts having physical symptoms. Lung cancer screening can help detect lung cancer in the earliest stages when it may be easier to treat.    Who should be screened for lung cancer?  We suggest lung cancer screening for anyone who is at high-risk for lung cancer. You are in the high-risk group if you:      are between the ages of 55 and 79, and    have smoked at least 1 pack of cigarettes a day for 30 or more years, and    still smoke or have quit within the past 15 years.    However, if you have a new cough or shortness of breath, you should talk to your doctor before being screened.    Some national lung health advocacy groups also recommend screening for people ages 50 to 79 who have smoked an average of 1 pack of cigarettes a day for 20 years. They must also have at least 1 other risk factor for lung cancer, not including exposure to secondhand  smoke. Other risk factors are having had cancer in the past, emphysema, pulmonary fibrosis, COPD, a family history of lung cancer, or exposure to certain materials such as arsenic, asbestos, beryllium, cadmium, chromium, diesel fumes, nickel, radon or silica. Your care team can help you know if you have one of these risk factors.     Why does it matter if I have symptoms?  Certain symptoms can be a sign that you have a condition in your lungs that should be checked and treated by your doctor. These symptoms include fever, chest pain, a new or changing cough, shortness of breath that you have never felt before, coughing up blood or unexplained weight loss. Having any of these symptoms can greatly affect the results of lung cancer screening.       Should all smokers get an LDCT lung cancer screening exam?  It depends. Lung cancer screening is for a very specific group of men and women who have a history of heavy smoking over a long period of time (see  Who should be screened for lung cancer  above).  I am in the high-risk group, but have been diagnosed with cancer in the past. Is LDCT lung cancer screening right for me?  In some cases, you should not have LDCT lung screening, such as when your doctor is already following your cancer with CT scan studies. Your doctor will help you decide if LDCT lung screening is right for you.  Do I need to have a screening exam every year?  Yes. If you are in the high-risk group described earlier, you should get an LDCT lung cancer screening exam every year until you are 79, or are no longer willing or able to undergo screening and possible procedures to diagnose and treat lung cancer.  How effective is LDCT at preventing death from lung cancer?  Studies have shown that LDCT lung cancer screening can lower the risk of death from lung cancer by 20 percent in people who are at high-risk.  What are the risks?  There are some risks and limitations of LDCT lung cancer screening. We want  to make sure you understand the risks and benefits, so please let us know if you have any questions. Your doctor may want to talk with you more about these risks.    Radiation exposure: As with any exam that uses radiation, there is a very small increased risk of cancer. The amount of radiation in LDCT is small--about the same amount a person would get from a mammogram. Your doctor orders the exam when he or she feels the potential benefits outweigh the risks.    False negatives: No test is perfect, including LDCT. It is possible that you may have a medical condition, including lung cancer, that is not found during your exam. This is called a false negative result.    False positives and more testing: LDCT very often finds something in the lung that could be cancer, but in fact is not. This is called a false positive result. False positive tests often cause anxiety. To make sure these findings are not cancer, you may need to have more tests. These tests will be done only if you give us permission. Sometimes patients need a treatment that can have side effects, such as a biopsy. For more information on false positives, see  What can I expect from the results?     Findings not related to lung cancer: Your LDCT exam also takes pictures of areas of your body next to your lungs. In a very small number of cases, the CT scan will show an abnormal finding in one of these areas, such as your kidneys, adrenal glands, liver or thyroid. This finding may not be serious, but you may need more tests. Your doctor can help you decide what other tests you may need, if any.  What can I expect from the results?  About 1 out of 4 LDCT exams will find something that may need more tests. Most of the time, these findings are lung nodules. Lung nodules are very small collections of tissue in the lung. These nodules are very common, and the vast majority--more than 97 percent--are not cancer (benign). Most are normal lymph nodes or small  areas of scarring from past infections.  But, if a small lung nodule is found to be cancer, the cancer can be cured more than 90 percent of the time. To know if the nodule is cancer, we may need to get more images before your next yearly screening exam. If the nodule has suspicious features (for example, it is large, has an odd shape or grows over time), we will refer you to a specialist for further testing.  Will my doctor also get the results?  Yes. Your doctor will get a copy of your results.  Is it okay to keep smoking now that there s a cancer screening exam?  No. Tobacco is one of the strongest cancer-causing agents. It causes not only lung cancer, but other cancers and cardiovascular (heart) diseases as well. The damage caused by smoking builds over time. This means that the longer you smoke, the higher your risk of disease. While it is never too late to quit, the sooner you quit, the better.  Where can I find help to quit smoking?  The best way to prevent lung cancer is to stop smoking. If you have already quit smoking, congratulations and keep it up! For help on quitting smoking, please call [facility] at 9-204-158-PKRY ([phone]) or the American [employer] Society at 1-766.648.4206 to find local resources near you.  One-on-one health coaching:  If you d prefer to work individually with a health care provider on tobacco cessation, we offer:      Medication Therapy Management:  Our specially trained pharmacists work closely with you and your doctor to help you quit smoking.  Call [phone] or [phone] (toll free).     Can Do: Health coaching offered by [facility].  [email or web address]

## 2019-10-07 NOTE — LETTER
"10/7/2019      RE: Blayne Gonzalez  [address]  [facility] [address]       [facility]  Clinic Follow-Up Visit Note    Reason for visit: \"COPD\"    HPI/Interval History: 63-year-old male with history of COPD was seen for follow-up.  He was last seen in my clinic October 2018 at which time he was doing well from a respiratory standpoint.  His PFTs at that visit showed improvement in his obstructive lung disease (FEV1 at that time was 1.59 L).    He again has done well over the last year.  He previously was maintained on Breo but that has since been changed to Advair given insurance issues.  He tells me he takes it only one time a day and over the last few months has noticed getting more short of breath towards the end of the day.  He will use his rescue inhaler 1-2 times a day with good relief.  He has had 0 exacerbations in the past year and in fact has had none in the last 3 years.  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  Has 0 limitations from a functional standpoint.    Remains off cigarettes and has been so for roughly 5 years.  He is interested in getting his flu shot today.    PMH:  Past Medical History:   Diagnosis Date     COPD (chronic obstructive pulmonary disease) (H)      Pulmonary nodule        Allergies:  No Known Allergies    Social History:  Social History     Socioeconomic History     Marital status: Single     Spouse name: Not on file     Number of children: Not on file     Years of education: Not on file     Highest education level: Not on file   Occupational History     Not on file   Social Needs     Financial resource strain: Not on file     Food insecurity:     Worry: Not on file     Inability: Not on file     Transportation needs:     Medical: Not on file "     Non-medical: Not on file   Tobacco Use     Smoking status: Former Smoker     Packs/day: 0.50     Years: 38.00     Pack years: 19.00     Types: Cigarettes     Last attempt to quit: 10/5/2015     Years since quittin.0     Smokeless tobacco: Never Used   Substance and Sexual Activity     Alcohol use: Yes     Alcohol/week: 0.0 standard drinks     Comment: one beer every few months     Drug use: Yes     Comment: history  of marijuana use within the year     Sexual activity: Yes     Partners: Female   Lifestyle     Physical activity:     Days per week: Not on file     Minutes per session: Not on file     Stress: Not on file   Relationships     Social connections:     Talks on phone: Not on file     Gets together: Not on file     Attends Mandaen service: Not on file     Active member of club or organization: Not on file     Attends meetings of clubs or organizations: Not on file     Relationship status: Not on file     Intimate partner violence:     Fear of current or ex partner: Not on file     Emotionally abused: Not on file     Physically abused: Not on file     Forced sexual activity: Not on file   Other Topics Concern     Parent/sibling w/ CABG, MI or angioplasty before 65F 55M? Not Asked   Social History Narrative    The patient has a 40 pk yr tobacco hx.  He has no ongoing active use.  Alcohol use is 1 alcoholic drinks per week.  He does use of occasional marijuana gummies.          He is employed as a hernandez and builder. .          The patient is single.  Has 0 children.        Sexually active with one female partners.        History of smoking: YES  Active user: NO   Pack years: 40  Quit:     History of alcohol use: NO  Amount: none  Active user: NO      History of recreational drug use: NO  Active user: NO    He is employed as a hernandez. Retiring in the next 2 months.       The patient is single.  Has no children.    Hot Tub Exposure: NO  Recent Travel:  NO   Hx of incarceration:  NO  Bird  "Exposure:   NO  Animal Exposure:  NO  Inhalation Exposure:  YES, sheet metal grinding dust  Hobbies:   NO    Medications:  Current Outpatient Medications   Medication Sig Dispense Refill     albuterol (PROAIR HFA, PROVENTIL HFA, VENTOLIN HFA) 108 (90 BASE) MCG/ACT inhaler Inhale 2 puffs into the lungs every 6 hours as needed for shortness of breath / dyspnea or wheezing 1 Inhaler 11     fluticasone-salmeterol (ADVAIR) 100-50 MCG/DOSE inhaler Inhale 1 puff into the lungs 2 times daily 1 Inhaler 6     ibuprofen (ADVIL,MOTRIN) 600 MG tablet Take 600 mg by mouth every 6 hours as needed.       sildenafil (VIAGRA) 100 MG tablet Take 1 tablet (100 mg) by mouth daily as needed (for erectile dysfunction) 15 tablet 9     dextromethorphan-guaiFENesin (MUCINEX DM)  MG per 12 hr tablet Take 1 tablet by mouth as needed        gabapentin (NEURONTIN) 300 MG capsule Take 1 capsule (300 mg) by mouth nightly as needed (back spasm) (Patient not taking: Reported on 10/7/2019) 30 capsule 1     naproxen (NAPROSYN) 500 MG tablet Take 1 tablet (500 mg) by mouth 2 times daily (with meals) (Patient not taking: Reported on 10/7/2019) 28 tablet 0       Family History:  No family history on file.    Review of Systems:  Complete 10 point ROS negative unless mentioned in HPI    Physical Exam:  /77   Pulse 101   Ht 1.753 m (5' 9\")   Wt 57 kg (125 lb 9.6 oz)   SpO2 97%   BMI 18.55 kg/m     General:  Adult male;appears stated age; no acute distress; the patient is a good historian  HEENT:  NCAT; EOMI; No icterus; no injection; MMM  Neck: Supple, full range of motion, no lymphadenopathy  Pulm: Decreased breathsounds throughout  CV: RRR, no murmurs, rubs or gallops  Abdomen: Soft, NT, ND, + BS  Extremities:  No cyanosis or clubbing, no edema  Neuro: Alert and oriented x 3, moves all extremities no obvious weakness  Skin: No skin rashes noted    Labs and Radiology:  No new labs or imaging.     PFT's:    No new PFTs    PFT " Interpretation:  Previous PFTs personally reviewed again. Severe obstruction with mild reduction in DLCO.    Most Recent Pulmonary Function Testing    FVC-Pred   Date Value Ref Range Status   10/03/2018 4.17 L      FVC-Pre   Date Value Ref Range Status   10/03/2018 3.79 L      FVC-%Pred-Pre   Date Value Ref Range Status   10/03/2018 90 %      FEV1-Pre   Date Value Ref Range Status   10/03/2018 1.59 L      FEV1-%Pred-Pre   Date Value Ref Range Status   10/03/2018 48 %      FEV1FVC-Pred   Date Value Ref Range Status   10/03/2018 75 %      FEV1FVC-Pre   Date Value Ref Range Status   10/03/2018 42 %      No results found for: 20029  FEFMax-Pred   Date Value Ref Range Status   10/03/2018 9.04 L/sec      FEFMax-Pre   Date Value Ref Range Status   10/03/2018 3.71 L/sec      FEFMax-%Pred-Pre   Date Value Ref Range Status   10/03/2018 40 %      ExpTime-Pre   Date Value Ref Range Status   10/03/2018 13.41 sec      FIFMax-Pre   Date Value Ref Range Status   10/03/2018 5.72 L/sec      FEV1FEV6-Pred   Date Value Ref Range Status   10/03/2018 79 %      FEV1FEV6-Pre   Date Value Ref Range Status   10/03/2018 49 %      No results found for: 20055    Assessment and Plan:  Blayne Gonzalez is a 63-year-old male with history of COPD was seen for follow-up.      COPD: Severe obstruction on pulmonary function tests with significant bronchodilator response noted on previous test.  Continues to have very little limitations from a respiratory standpoint.  Recently switched from Breo to Advair given insurance issues.  Had been only taking once per day but is agreeable to change to twice a day given some increased dyspnea at nighttime.     -Continue Advair and albuterol as needed  -Home action plan as needed (prednisone 40 mg x 5 days and doxycycline 100 mg twice a day x 7 days)  -Flu shot administered today, he is up-to-date on his Pneumovax vaccinations  -Agreeable to low-dose CT lung cancer screening, will schedule before his insurance runs  out in 2 months     Return to clinic in 1 year.    Raymond Brannon MD   of Medicine   Division of Pulmonary, Allergy, Critical Care and Sleep Medicine  [facility]  Pager: [phone]    The above note was dictated using voice recognition software and may include typographical errors. Please contact the author for any clarifications.  Lung Cancer Screening Shared Decision Making Visit     Blayne Gonzalez is eligible for lung cancer screening on the basis of the information provided in my signed lung cancer screening order.     I have discussed with patient the risks and benefits of screening for lung cancer with low-dose CT.     The risks include:  radiation exposure: one low dose chest CT has as much ionizing radiation as about 15 chest x-rays or 6 months of background radiation living in [place]    false positives: 96% of positive findings/nodules are NOT cancer, but some might still require additional diagnostic evaluation, including biopsy  over-diagnosis: some slow growing cancers that might never have been clinically significant will be detected and treated unnecessarily     The benefit of early detection of lung cancer is contingent upon adherence to annual screening or more frequent follow up if indicated.     Furthermore, reaping the benefits of screening requires Blayne Gonzalez to be willing and physically able to undergo diagnostic procedures, if indicated. Although no specific guide is available for determining severity of comorbidities, it is reasonable to withhold screening in patients who have greater mortality risk from other diseases.     We did discuss that the only way to prevent lung cancer is to not smoke. Smoking cessation assistance was offered.    I did not offer risk estimation using a calculator such as this one:    ShouldIScreen    Raymond Brannon MD

## 2019-10-07 NOTE — LETTER
"10/7/2019       RE: Blayne Gonzalez  [address]  [facility] [address]     Dear Colleague,    Thank you for referring your patient, Blayne Gonzalez, to the [facility] at [facility]. Please see a copy of my visit note below.    [facility]  Clinic Follow-Up Visit Note    Reason for visit: \"COPD\"    HPI/Interval History: 63-year-old male with history of COPD was seen for follow-up.  He was last seen in my clinic October 2018 at which time he was doing well from a respiratory standpoint.  His PFTs at that visit showed improvement in his obstructive lung disease (FEV1 at that time was 1.59 L).    He again has done well over the last year.  He previously was maintained on Breo but that has since been changed to Advair given insurance issues.  He tells me he takes it only one time a day and over the last few months has noticed getting more short of breath towards the end of the day.  He will use his rescue inhaler 1-2 times a day with good relief.  He has had 0 exacerbations in the past year and in fact has had none in the last 3 years.  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  Has 0 limitations from a functional standpoint.    Remains off cigarettes and has been so for roughly 5 years.  He is interested in getting his flu shot today.    PMH:  Past Medical History:   Diagnosis Date     COPD (chronic obstructive pulmonary disease) (H)      Pulmonary nodule        Allergies:  No Known Allergies    Social History:  Social History     Socioeconomic History     Marital status: Single     Spouse name: Not on file     Number of children: Not on file     Years of education: Not on file     Highest education level: Not on file "   Occupational History     Not on file   Social Needs     Financial resource strain: Not on file     Food insecurity:     Worry: Not on file     Inability: Not on file     Transportation needs:     Medical: Not on file     Non-medical: Not on file   Tobacco Use     Smoking status: Former Smoker     Packs/day: 0.50     Years: 38.00     Pack years: 19.00     Types: Cigarettes     Last attempt to quit: 10/5/2015     Years since quittin.0     Smokeless tobacco: Never Used   Substance and Sexual Activity     Alcohol use: Yes     Alcohol/week: 0.0 standard drinks     Comment: one beer every few months     Drug use: Yes     Comment: history  of marijuana use within the year     Sexual activity: Yes     Partners: Female   Lifestyle     Physical activity:     Days per week: Not on file     Minutes per session: Not on file     Stress: Not on file   Relationships     Social connections:     Talks on phone: Not on file     Gets together: Not on file     Attends Anabaptist service: Not on file     Active member of club or organization: Not on file     Attends meetings of clubs or organizations: Not on file     Relationship status: Not on file     Intimate partner violence:     Fear of current or ex partner: Not on file     Emotionally abused: Not on file     Physically abused: Not on file     Forced sexual activity: Not on file   Other Topics Concern     Parent/sibling w/ CABG, MI or angioplasty before 65F 55M? Not Asked   Social History Narrative    The patient has a 40 pk yr tobacco hx.  He has no ongoing active use.  Alcohol use is 1 alcoholic drinks per week.  He does use of occasional marijuana gummies.          He is employed as a hernandez and builder. .          The patient is single.  Has 0 children.        Sexually active with one female partners.        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       The patient is single.  Has no children.    Hot Tub Exposure: NO  Recent Travel:  NO   Hx of incarceration:  NO  Bird Exposure:   NO  Animal Exposure:  NO  Inhalation Exposure:  YES, sheet metal grinding dust  Hobbies:   NO    Medications:  Current Outpatient Medications   Medication Sig Dispense Refill     albuterol (PROAIR HFA, PROVENTIL HFA, VENTOLIN HFA) 108 (90 BASE) MCG/ACT inhaler Inhale 2 puffs into the lungs every 6 hours as needed for shortness of breath / dyspnea or wheezing 1 Inhaler 11     fluticasone-salmeterol (ADVAIR) 100-50 MCG/DOSE inhaler Inhale 1 puff into the lungs 2 times daily 1 Inhaler 6     ibuprofen (ADVIL,MOTRIN) 600 MG tablet Take 600 mg by mouth every 6 hours as needed.       sildenafil (VIAGRA) 100 MG tablet Take 1 tablet (100 mg) by mouth daily as needed (for erectile dysfunction) 15 tablet 9     dextromethorphan-guaiFENesin (MUCINEX DM)  MG per 12 hr tablet Take 1 tablet by mouth as needed        gabapentin (NEURONTIN) 300 MG capsule Take 1 capsule (300 mg) by mouth nightly as needed (back spasm) (Patient not taking: Reported on 10/7/2019) 30 capsule 1     naproxen (NAPROSYN) 500 MG tablet Take 1 tablet (500 mg) by mouth 2 times daily (with meals) (Patient not taking: Reported on 10/7/2019) 28 tablet 0       Family History:  No family history on file.    Review of Systems:  Complete 10 point ROS negative unless mentioned in HPI    Physical Exam:  /77   Pulse 101   Ht 1.753 m (5' 9\")   Wt 57 kg (125 lb 9.6 oz)   SpO2 97%   BMI 18.55 kg/m     General:  Adult male;appears stated age; no acute distress; the patient is a good historian  HEENT:  NCAT; EOMI; No icterus; no injection; MMM  Neck: Supple, full range of motion, no lymphadenopathy  Pulm: Decreased breathsounds throughout  CV: RRR, no murmurs, rubs or gallops  Abdomen: Soft, NT, ND, + BS  Extremities:  No cyanosis or " clubbing, no edema  Neuro: Alert and oriented x 3, moves all extremities no obvious weakness  Skin: No skin rashes noted    Labs and Radiology:  No new labs or imaging.     PFT's:    No new PFTs    PFT Interpretation:  Previous PFTs personally reviewed again. Severe obstruction with mild reduction in DLCO.    Most Recent Pulmonary Function Testing    FVC-Pred   Date Value Ref Range Status   10/03/2018 4.17 L      FVC-Pre   Date Value Ref Range Status   10/03/2018 3.79 L      FVC-%Pred-Pre   Date Value Ref Range Status   10/03/2018 90 %      FEV1-Pre   Date Value Ref Range Status   10/03/2018 1.59 L      FEV1-%Pred-Pre   Date Value Ref Range Status   10/03/2018 48 %      FEV1FVC-Pred   Date Value Ref Range Status   10/03/2018 75 %      FEV1FVC-Pre   Date Value Ref Range Status   10/03/2018 42 %      No results found for: 20029  FEFMax-Pred   Date Value Ref Range Status   10/03/2018 9.04 L/sec      FEFMax-Pre   Date Value Ref Range Status   10/03/2018 3.71 L/sec      FEFMax-%Pred-Pre   Date Value Ref Range Status   10/03/2018 40 %      ExpTime-Pre   Date Value Ref Range Status   10/03/2018 13.41 sec      FIFMax-Pre   Date Value Ref Range Status   10/03/2018 5.72 L/sec      FEV1FEV6-Pred   Date Value Ref Range Status   10/03/2018 79 %      FEV1FEV6-Pre   Date Value Ref Range Status   10/03/2018 49 %      No results found for: 20055    Assessment and Plan:  Blayne Gonzalez is a 63-year-old male with history of COPD was seen for follow-up.      COPD: Severe obstruction on pulmonary function tests with significant bronchodilator response noted on previous test.  Continues to have very little limitations from a respiratory standpoint.  Recently switched from Breo to Advair given insurance issues.  Had been only taking once per day but is agreeable to change to twice a day given some increased dyspnea at nighttime.     -Continue Advair and albuterol as needed  -Home action plan as needed (prednisone 40 mg x 5 days and  doxycycline 100 mg twice a day x 7 days)  -Flu shot administered today, he is up-to-date on his Pneumovax vaccinations  -Agreeable to low-dose CT lung cancer screening, will schedule before his insurance runs out in 2 months     Return to clinic in 1 year.    Raymond Brannon MD   of Medicine   Division of Pulmonary, Allergy, Critical Care and Sleep Medicine  [facility]  Pager: [phone]    The above note was dictated using voice recognition software and may include typographical errors. Please contact the author for any clarifications.  Lung Cancer Screening Shared Decision Making Visit     Blayne Gonzalez is eligible for lung cancer screening on the basis of the information provided in my signed lung cancer screening order.     I have discussed with patient the risks and benefits of screening for lung cancer with low-dose CT.     The risks include:  radiation exposure: one low dose chest CT has as much ionizing radiation as about 15 chest x-rays or 6 months of background radiation living in [place]    false positives: 96% of positive findings/nodules are NOT cancer, but some might still require additional diagnostic evaluation, including biopsy  over-diagnosis: some slow growing cancers that might never have been clinically significant will be detected and treated unnecessarily     The benefit of early detection of lung cancer is contingent upon adherence to annual screening or more frequent follow up if indicated.     Furthermore, reaping the benefits of screening requires Blayne Gonzalez to be willing and physically able to undergo diagnostic procedures, if indicated. Although no specific guide is available for determining severity of comorbidities, it is reasonable to withhold screening in patients who have greater mortality risk from other diseases.     We did discuss that the only way to prevent lung cancer is to not smoke. Smoking cessation assistance was offered.    I did  not offer risk estimation using a calculator such as this one:    ShouldIScreen    Again, thank you for allowing me to participate in the care of your patient.      Sincerely,    Raymond Brannon MD

## 2019-10-07 NOTE — PROGRESS NOTES
"[facility]  Clinic Follow-Up Visit Note    Reason for visit: \"COPD\"    HPI/Interval History: 63-year-old male with history of COPD was seen for follow-up.  He was last seen in my clinic October 2018 at which time he was doing well from a respiratory standpoint.  His PFTs at that visit showed improvement in his obstructive lung disease (FEV1 at that time was 1.59 L).    He again has done well over the last year.  He previously was maintained on Breo but that has since been changed to Advair given insurance issues.  He tells me he takes it only one time a day and over the last few months has noticed getting more short of breath towards the end of the day.  He will use his rescue inhaler 1-2 times a day with good relief.  He has had 0 exacerbations in the past year and in fact has had none in the last 3 years.  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  Has 0 limitations from a functional standpoint.    Remains off cigarettes and has been so for roughly 5 years.  He is interested in getting his flu shot today.    PMH:  Past Medical History:   Diagnosis Date     COPD (chronic obstructive pulmonary disease) (H)      Pulmonary nodule        Allergies:  No Known Allergies    Social History:  Social History     Socioeconomic History     Marital status: Single     Spouse name: Not on file     Number of children: Not on file     Years of education: Not on file     Highest education level: Not on file   Occupational History     Not on file   Social Needs     Financial resource strain: Not on file     Food insecurity:     Worry: Not on file     Inability: Not on file     Transportation needs:     Medical: Not on file     Non-medical: Not on file   Tobacco Use     Smoking status: Former Smoker     Packs/day: " 0.50     Years: 38.00     Pack years: 19.00     Types: Cigarettes     Last attempt to quit: 10/5/2015     Years since quittin.0     Smokeless tobacco: Never Used   Substance and Sexual Activity     Alcohol use: Yes     Alcohol/week: 0.0 standard drinks     Comment: one beer every few months     Drug use: Yes     Comment: history  of marijuana use within the year     Sexual activity: Yes     Partners: Female   Lifestyle     Physical activity:     Days per week: Not on file     Minutes per session: Not on file     Stress: Not on file   Relationships     Social connections:     Talks on phone: Not on file     Gets together: Not on file     Attends Holiness service: Not on file     Active member of club or organization: Not on file     Attends meetings of clubs or organizations: Not on file     Relationship status: Not on file     Intimate partner violence:     Fear of current or ex partner: Not on file     Emotionally abused: Not on file     Physically abused: Not on file     Forced sexual activity: Not on file   Other Topics Concern     Parent/sibling w/ CABG, MI or angioplasty before 65F 55M? Not Asked   Social History Narrative    The patient has a 40 pk yr tobacco hx.  He has no ongoing active use.  Alcohol use is 1 alcoholic drinks per week.  He does use of occasional marijuana gummies.          He is employed as a hernandez and builder. .          The patient is single.  Has 0 children.        Sexually active with one female partners.        History of smoking: YES  Active user: NO   Pack years: 40  Quit:     History of alcohol use: NO  Amount: none  Active user: NO      History of recreational drug use: NO  Active user: NO    He is employed as a hernandez. Retiring in the next 2 months.       The patient is single.  Has no children.    Hot Tub Exposure: NO  Recent Travel:  NO   Hx of incarceration:  NO  Bird Exposure:   NO  Animal Exposure:  NO  Inhalation Exposure:  YES, sheet metal grinding  "dust  Hobbies:   NO    Medications:  Current Outpatient Medications   Medication Sig Dispense Refill     albuterol (PROAIR HFA, PROVENTIL HFA, VENTOLIN HFA) 108 (90 BASE) MCG/ACT inhaler Inhale 2 puffs into the lungs every 6 hours as needed for shortness of breath / dyspnea or wheezing 1 Inhaler 11     fluticasone-salmeterol (ADVAIR) 100-50 MCG/DOSE inhaler Inhale 1 puff into the lungs 2 times daily 1 Inhaler 6     ibuprofen (ADVIL,MOTRIN) 600 MG tablet Take 600 mg by mouth every 6 hours as needed.       sildenafil (VIAGRA) 100 MG tablet Take 1 tablet (100 mg) by mouth daily as needed (for erectile dysfunction) 15 tablet 9     dextromethorphan-guaiFENesin (MUCINEX DM)  MG per 12 hr tablet Take 1 tablet by mouth as needed        gabapentin (NEURONTIN) 300 MG capsule Take 1 capsule (300 mg) by mouth nightly as needed (back spasm) (Patient not taking: Reported on 10/7/2019) 30 capsule 1     naproxen (NAPROSYN) 500 MG tablet Take 1 tablet (500 mg) by mouth 2 times daily (with meals) (Patient not taking: Reported on 10/7/2019) 28 tablet 0       Family History:  No family history on file.    Review of Systems:  Complete 10 point ROS negative unless mentioned in HPI    Physical Exam:  /77   Pulse 101   Ht 1.753 m (5' 9\")   Wt 57 kg (125 lb 9.6 oz)   SpO2 97%   BMI 18.55 kg/m    General:  Adult male;appears stated age; no acute distress; the patient is a good historian  HEENT:  NCAT; EOMI; No icterus; no injection; MMM  Neck: Supple, full range of motion, no lymphadenopathy  Pulm: Decreased breathsounds throughout  CV: RRR, no murmurs, rubs or gallops  Abdomen: Soft, NT, ND, + BS  Extremities:  No cyanosis or clubbing, no edema  Neuro: Alert and oriented x 3, moves all extremities no obvious weakness  Skin: No skin rashes noted    Labs and Radiology:  No new labs or imaging.     PFT's:    No new PFTs    PFT Interpretation:  Previous PFTs personally reviewed again. Severe obstruction with mild reduction in " DLCO.    Most Recent Pulmonary Function Testing    FVC-Pred   Date Value Ref Range Status   10/03/2018 4.17 L      FVC-Pre   Date Value Ref Range Status   10/03/2018 3.79 L      FVC-%Pred-Pre   Date Value Ref Range Status   10/03/2018 90 %      FEV1-Pre   Date Value Ref Range Status   10/03/2018 1.59 L      FEV1-%Pred-Pre   Date Value Ref Range Status   10/03/2018 48 %      FEV1FVC-Pred   Date Value Ref Range Status   10/03/2018 75 %      FEV1FVC-Pre   Date Value Ref Range Status   10/03/2018 42 %      No results found for: 20029  FEFMax-Pred   Date Value Ref Range Status   10/03/2018 9.04 L/sec      FEFMax-Pre   Date Value Ref Range Status   10/03/2018 3.71 L/sec      FEFMax-%Pred-Pre   Date Value Ref Range Status   10/03/2018 40 %      ExpTime-Pre   Date Value Ref Range Status   10/03/2018 13.41 sec      FIFMax-Pre   Date Value Ref Range Status   10/03/2018 5.72 L/sec      FEV1FEV6-Pred   Date Value Ref Range Status   10/03/2018 79 %      FEV1FEV6-Pre   Date Value Ref Range Status   10/03/2018 49 %      No results found for: 20055    Assessment and Plan:  Blayne Gonzalez is a 63-year-old male with history of COPD was seen for follow-up.      COPD: Severe obstruction on pulmonary function tests with significant bronchodilator response noted on previous test.  Continues to have very little limitations from a respiratory standpoint.  Recently switched from Breo to Advair given insurance issues.  Had been only taking once per day but is agreeable to change to twice a day given some increased dyspnea at nighttime.     -Continue Advair and albuterol as needed  -Home action plan as needed (prednisone 40 mg x 5 days and doxycycline 100 mg twice a day x 7 days)  -Flu shot administered today, he is up-to-date on his Pneumovax vaccinations  -Agreeable to low-dose CT lung cancer screening, will schedule before his insurance runs out in 2 months     Return to clinic in 1 year.    Raymond Brannon MD   of  Medicine   Division of Pulmonary, Allergy, Critical Care and Sleep Medicine  [facility]  Pager: [phone]    The above note was dictated using voice recognition software and may include typographical errors. Please contact the author for any clarifications.  Lung Cancer Screening Shared Decision Making Visit     Blayne Gonzalez is eligible for lung cancer screening on the basis of the information provided in my signed lung cancer screening order.     I have discussed with patient the risks and benefits of screening for lung cancer with low-dose CT.     The risks include:  radiation exposure: one low dose chest CT has as much ionizing radiation as about 15 chest x-rays or 6 months of background radiation living in [place]    false positives: 96% of positive findings/nodules are NOT cancer, but some might still require additional diagnostic evaluation, including biopsy  over-diagnosis: some slow growing cancers that might never have been clinically significant will be detected and treated unnecessarily     The benefit of early detection of lung cancer is contingent upon adherence to annual screening or more frequent follow up if indicated.     Furthermore, reaping the benefits of screening requires Blayne Gonzalez to be willing and physically able to undergo diagnostic procedures, if indicated. Although no specific guide is available for determining severity of comorbidities, it is reasonable to withhold screening in patients who have greater mortality risk from other diseases.     We did discuss that the only way to prevent lung cancer is to not smoke. Smoking cessation assistance was offered.    I did not offer risk estimation using a calculator such as this one:    ShouldIScreen

## 2019-10-08 ENCOUNTER — TELEPHONE (OUTPATIENT)
Dept: PULMONOLOGY | Facility: CLINIC | Age: 64
End: 2019-10-08

## 2019-10-08 NOTE — TELEPHONE ENCOUNTER
[facility] Radiology Lung Cancer Screening CT result notification:     LDCT/Lung Cancer Screening CT Exam date: 10/7/19  Radiologist Impression AND Recommendations:   ACR Assessment Category (v1.1):  Lung-RADS Category 4X. Very  Suspicious. Multiple pulmonary nodules as detailed above, including an a partially calcified nodule demonstrating interval enlargement  compared to CT dated 12/19/2012. Although this lesion meets criteria  for lung or category 4X, its CT appearance is most consistent with a  granuloma.     Recommendation:  Lung-RADS Category 4X. Recommend follow-up CT in 3 months to assess for stability.   Pertinent Findings:  Numerous calcified and noncalcified pulmonary nodules scattered  throughout both lungs with examples lesions as follows in series 2:     Image 92:11 x 5 mm solid, partially calcified nodule in the left upper  lobe, measured 11 x 8 mm on prior exam  Image 112: 15 x 11 mm, solid, spiculated nodule with punctate  calcifications in the right upper lobe, measured 18 x 12 mm on prior  exam  Image 114:8 x 9 mm solid, partially calcified nodule in the posterior  left upper lobe, measured 9 mm on prior exam  Image 114:13 x 5 mm solid nodule in the left upper lobe, measured 6 mm  on prior exam   Image 302:6 x 5 mm solid nodule in the right lower lobe, measured 5 x  5 mm on prior exam.  Additional scattered sub-5 mm nodules throughout both lungs. Numerous  calcified granulomas. Multiple fissural nodules, which likely  represent lymph nodes.     Ordering Provider: Raymond Brannon MD  Blayne Gonzalez did not receive the remaining radiology results from her provider.     Lung Nodule Program Protocol recommendation [Pertaining to lung nodules]:    Lung RADS 4B/4X Protocol:  Results RN to notify Patient of results/recommendations and routes telephone encounter to Lung Nodule CNS Team   Results RN routed telephone encounter to CNS team (Yes/No/NA): Yes   Results RN will inform the patient  that a team of specialist will be discussing  their case and contacting them with the next steps within 24 to 48 hours.  If  the Patient does not hear back within 48 hours, encourage Patient to call the  Lung Nodule clinic at [phone], press option 2 to speak with the nurse  (Yes/No/NA): Yes    RN communicated the lung nodule finding to the patient (Yes/No):  Yes  The patient had the following questions: will be retiring soon and has concerns with expense/insurance questions  Correct letter sent as per Lung nodule protocol (Yes/No):  Yes  Did Patient have any CT's of chest previous? (inquire only if no CT's were used for comparison) (Yes/No/NA) NA    Lung Nodule Clinics    Pulomonary (Lung Care) Clinic at the [facility]  Floor 2, Check-In D, [address], [place], [place]    Hours: M-F 7AM to 5PM    [facility]     Floor 2, [address], [place]  [phone]    Hours: M - F 7AM - 7PM;  Sat 8A to noon     [facility]    [address] [place], [place], [phone]    Hours: M - F 7AM - 7PM;  Sat 8A to noon    [facility] Result RN  Lung Nodule and Emergency Dept Lab Result F/u RN  Ph# [phone]

## 2019-10-11 ENCOUNTER — TELEPHONE (OUTPATIENT)
Dept: PULMONOLOGY | Facility: CLINIC | Age: 64
End: 2019-10-11

## 2019-10-11 ENCOUNTER — PRE VISIT (OUTPATIENT)
Dept: PULMONOLOGY | Facility: CLINIC | Age: 64
End: 2019-10-11

## 2019-10-11 DIAGNOSIS — R91.1 INCIDENTAL LUNG NODULE, GREATER THAN OR EQUAL TO 8MM: Primary | ICD-10-CM

## 2019-10-11 NOTE — TELEPHONE ENCOUNTER
I called Blayne as he had some additional questions re: appt dates/times. He wanted to make sure that he is scheduled for the soonest available appts as his insurance is ending mid-November. I also confirmed the PET location is at [facility] & Dr Carrington is at the [facility]. Blayne had no additional questions for me, but did request a call from Zaira Cruz to answer some clinical questions. I sent an inbasket to Zaira requesting she calls patient.

## 2019-10-11 NOTE — TELEPHONE ENCOUNTER
Patient returning call.  He was given the phone number for Lung nodule clinic and RN transferred to clinic.  Left message with SHARRI Reyes CNS (transferred to her phone from scheduling).    Blayne is hoping to talk with someone at the clinic about expediting his PET scan and appt due to insurance issues (No insurance after November).  RN spoke with Zaina Carroll, Clinic RN, and she will call Blayne.    Javier Saucedo RN  [facility]  Emergency Dept Lab Result RN  Ph# [phone]

## 2019-10-11 NOTE — TELEPHONE ENCOUNTER
ONCOLOGY INTAKE: Records Information      APPT INFORMATION: 10/28/19 - [facility] - [facility]  Referring provider:  Raymond Brannon   Referring provider s clinic:  Pulm  Reason for visit/diagnosis: abnormal finding on screening CT  Has patient been notified of appointment date and time?: Yes    RECORDS INFORMATION:  Were the records received with the referral (via [employer])? Internal referral    Has patient been seen for any external appt for this diagnosis? No    If yes, where? NA    Has patient had any imaging or procedures outside of [place]  [facility] for this condition? No      If Yes, where? NA    ADDITIONAL INFORMATION:  Scheduled via inbasket from Zaira  Called pt to schedule - pt requests letter with info    *PET scheduled for 10/15/19 at 645am at [facility]*

## 2019-10-14 NOTE — TELEPHONE ENCOUNTER
RECORDS STATUS - ALL OTHER DIAGNOSIS      RECORDS RECEIVED FROM: [facility]   DATE RECEIVED: 10/14/19   NOTES STATUS DETAILS   OFFICE NOTE from referring provider [facility] Via Telephone Enc 10/8/19 - [place]: Pulmonology   OFFICE NOTE from medical oncologist NA    DISCHARGE SUMMARY from hospital NA    DISCHARGE REPORT from the ER PA    PFT/Bronchoscopy Epic 10/3/18, 5/24/17, 12/16/11, 11/15/11 (Bronchoscopy), 2/12/10   OPERATIVE REPORT NA    MEDICATION LIST [facility] 10/7/19   CLINICAL TRIAL TREATMENTS TO DATE     LABS     PATHOLOGY REPORTS NA    ANYTHING RELATED TO DIAGNOSIS Epic    GENONOMIC TESTING     TYPE:     IMAGING (NEED IMAGES & REPORT)     CT SCANS PACS 10/7/19   MRI     MAMMO     ULTRASOUND     PET Scheduled 10/15  [facility]

## 2019-10-15 ENCOUNTER — ANCILLARY PROCEDURE (OUTPATIENT)
Dept: PET IMAGING | Facility: CLINIC | Age: 64
End: 2019-10-15
Attending: CLINICAL NURSE SPECIALIST
Payer: COMMERCIAL

## 2019-10-15 DIAGNOSIS — R91.1 INCIDENTAL LUNG NODULE, GREATER THAN OR EQUAL TO 8MM: ICD-10-CM

## 2019-10-15 LAB
CREAT BLD-MCNC: 0.8 MG/DL (ref 0.5–1.2)
GFR SERPL CREATININE-BSD FRML MDRD: >90 ML/MIN/{1.73_M2}
GFRB: NORMAL
GLUCOSE SERPL-MCNC: 95 MG/DL (ref 70–99)

## 2019-10-16 NOTE — DISCHARGE INSTRUCTIONS

## 2019-10-22 PROBLEM — M54.41 ACUTE RIGHT-SIDED LOW BACK PAIN WITH RIGHT-SIDED SCIATICA: Status: RESOLVED | Noted: 2019-08-02 | Resolved: 2019-10-22

## 2019-10-22 NOTE — PROGRESS NOTES
Discharge Note    Progress reporting period is from initial eval to Aug 2, 2019.     Blayne failed to return for next follow up visit and current status is unknown.  Please see information below for last relevant information on current status.  Patient seen for Rxs Used: 2 visits.    SUBJECTIVE  Subjective changes noted by patient:  Subjective: Reports overall 80-90% improvement. Feeling really confident about his ability to get better. .  Current pain level is Current Pain level: 0/10.     Previous pain level was   .   Changes in function:  Yes (See Goal flowsheet attached for changes in current functional level)  Adverse reaction to treatment or activity: None    OBJECTIVE  Changes noted in objective findings: Objective: Full/painfree mechanical lumbar testing. Advancing core strengthening. .    ASSESSMENT/PLAN  Diagnosis: LBP   DIAGP:  The encounter diagnosis was Acute right-sided low back pain with right-sided sciatica.  Updated problem list and treatment plan:     Pain - HEP  Decreased ROM/flexibility - HEP  Decreased function - HEP  Decreased strength - HEP    STG/LTGs have been met or progress has been made towards goals:  Yes, please see goal flowsheet for most current information.    Assessment of Progress: current status is unknown.  Last current status: Assessment of progress: Pt is progressing well.  Self Management Plans:  HEP    I have re-evaluated this patient and find that the nature, scope, duration and intensity of the therapy is appropriate for the medical condition of the patient.  Blayne continues to require the following intervention to meet STG and LTG's:  HEP.    Recommendations:  Discharge with current home program.  Patient to follow up with MD as needed. Episode to be closed at this time and patient formally discharged from therapy.    Dick Perez, PT, DPT, OCS      Please refer to the daily flowsheet for treatment today, total treatment time and time spent performing 1:1 timed codes.

## 2019-10-28 ENCOUNTER — OFFICE VISIT (OUTPATIENT)
Dept: PULMONOLOGY | Facility: CLINIC | Age: 64
End: 2019-10-28
Attending: CLINICAL NURSE SPECIALIST
Payer: COMMERCIAL

## 2019-10-28 VITALS
HEART RATE: 90 BPM | BODY MASS INDEX: 18.84 KG/M2 | SYSTOLIC BLOOD PRESSURE: 137 MMHG | HEIGHT: 69 IN | RESPIRATION RATE: 14 BRPM | WEIGHT: 127.2 LBS | DIASTOLIC BLOOD PRESSURE: 89 MMHG | OXYGEN SATURATION: 96 % | TEMPERATURE: 97.5 F

## 2019-10-28 DIAGNOSIS — R91.1 PULMONARY NODULE: Primary | ICD-10-CM

## 2019-10-28 PROCEDURE — G0463 HOSPITAL OUTPT CLINIC VISIT: HCPCS | Mod: ZF

## 2019-10-28 ASSESSMENT — PAIN SCALES - GENERAL: PAINLEVEL: NO PAIN (0)

## 2019-10-28 ASSESSMENT — MIFFLIN-ST. JEOR: SCORE: 1376.32

## 2019-10-28 NOTE — PROGRESS NOTES
LUNG NODULE & INTERVENTIONAL PULMONARY CLINIC  [facility], [facility], [facility]     Blayne Gonzalez MRN# [id number]   Age: 63 year old YOB: 1955     Reason for Consultation: lung nodule(s)  Looks like a granuloma.  He has many already.  Plan for CT surveillance.  Will discuss with his primary pulmonologist.  Requesting Physician: Raymond Brannon MD  [address] [address]       Assessment and Plan:    1. New multiple pulmonary lung nodule(s). Given the characteristics on current/previous imaging and risk factors; I would classify this to be Low (<6%) risk for cancer.   This is technically a new large nodule but is characteristic of a granuloma.  Will discuss with primary pulmonologist.    Follow up CT, discuss timing with primary pulmonologist.    1 new problem (new nodule) with further workup       History:     Blayne Gonzalez is a 63 year old male with sig h/o for tobacco use who is here for evaluation/followup of lung nodule(s).  He had lung cancer screening and a new nodule was noted.  He had nodules monitored for several years as well as a biopsy, previously.  He is not smoking.  He has no other provocative, palliative or localizing factors. No timing factors.    - My interpretation of the images relevant for this visit includes: new upper lobe granuloma in setting of many calcified nodules.  - My interpretation of the PFT's relevant for this visit includes: Obstructive pattern            Past Medical History:      Past Medical History:   Diagnosis Date     COPD (chronic obstructive pulmonary disease) (H)      Pulmonary nodule            Past Surgical History:      Past Surgical History:   Procedure Laterality Date     cyst removed from nipple            Social History:     Social History     Tobacco Use     Smoking status: Former Smoker     Packs/day: 0.80     Years: 38.00     Pack years: 30.40     Types:  Cigarettes     Last attempt to quit: 10/5/2015     Years since quittin.0     Smokeless tobacco: Never Used   Substance Use Topics     Alcohol use: Yes     Alcohol/week: 0.0 standard drinks     Comment: one beer every few months          Family History:   No family history of lung disease.       Allergies:    No Known Allergies       Medications:     Current Outpatient Medications   Medication Sig     albuterol (PROAIR HFA/PROVENTIL HFA/VENTOLIN HFA) 108 (90 Base) MCG/ACT inhaler Inhale 2 puffs into the lungs every 6 hours as needed for shortness of breath / dyspnea or wheezing     dextromethorphan-guaiFENesin (MUCINEX DM)  MG per 12 hr tablet Take 1 tablet by mouth as needed      fluticasone-salmeterol (ADVAIR) 100-50 MCG/DOSE inhaler Inhale 1 puff into the lungs 2 times daily     ibuprofen (ADVIL,MOTRIN) 600 MG tablet Take 600 mg by mouth every 6 hours as needed.     sildenafil (VIAGRA) 100 MG tablet Take 1 tablet (100 mg) by mouth daily as needed (for erectile dysfunction)     gabapentin (NEURONTIN) 300 MG capsule Take 1 capsule (300 mg) by mouth nightly as needed (back spasm) (Patient not taking: Reported on 10/28/2019)     naproxen (NAPROSYN) 500 MG tablet Take 1 tablet (500 mg) by mouth 2 times daily (with meals) (Patient not taking: Reported on 10/28/2019)     No current facility-administered medications for this visit.           Review of Systems:     CONSTITUTIONAL: negative for fever, chills, change in weight  INTEGUMENTARY/SKIN: no rash or obvious new lesions  ENT/MOUTH: no sore throat, new sinus pain or nasal drainage  RESP: see interval history  CV: negative for chest pain, palpitations or peripheral edema  GI: no nausea, vomiting, change in stools  : no dysuria  MUSCULOSKELETAL: no myalgias, arthralgias  ENDOCRINE: blood sugars with adequate control  PSYCHIATRIC: mood stable  LYMPHATIC: no new lymphadenopathy  HEME: no bleeding or easy bruisability  NEURO: no numbness, weakness,  headaches         Physical Exam:     Temp:  [97.5  F (36.4  C)] 97.5  F (36.4  C)  Pulse:  [90] 90  Resp:  [14] 14  BP: (137)/(89) 137/89  SpO2:  [96 %] 96 %  Wt Readings from Last 4 Encounters:   10/28/19 57.7 kg (127 lb 3.2 oz)   10/07/19 57 kg (125 lb 9.6 oz)   08/15/19 57.2 kg (126 lb)   07/24/19 57.2 kg (126 lb)     Constitutional:   Awake, alert and in no apparent distress     Eyes:   Nonicteric, SANTY     ENT:    Trachea is midline. No gross neck abnormalities      Neck:   Supple without supraclavicular or cervical lymphadenopathy     Lungs:   Good air flow.  No crackles. No rhonchi.  No wheezes.     Cardiovascular:   Normal S1 and S2.  RRR.  No murmur, gallop or rub.  Radial, DP and PT pulses normal and symmetric     Abdomen:   NABS, soft, nontender, nondistended.  No HSM.     Musculoskeletal:   No edema.      Neurologic:   Alert and conversant. Cranial nerves  intact.       Skin:   Warm, dry.  No rash on limited exam.           Current Laboratory Data:   BUN 13, Cr 0.74  No results found for this or any previous visit (from the past 24 hour(s)).         Previous Pulmonary Function Testing     FVC-Pred   Date Value Ref Range Status   10/03/2018 4.17 L    05/24/2017 4.22 L      FVC-Pre   Date Value Ref Range Status   10/03/2018 3.79 L    05/24/2017 3.47 L      FVC-%Pred-Pre   Date Value Ref Range Status   10/03/2018 90 %    05/24/2017 82 %      FEV1-Pre   Date Value Ref Range Status   10/03/2018 1.59 L    05/24/2017 1.37 L      FEV1-%Pred-Pre   Date Value Ref Range Status   10/03/2018 48 %    05/24/2017 41 %      FEV1FVC-Pred   Date Value Ref Range Status   10/03/2018 75 %    05/24/2017 78 %      FEV1FVC-Pre   Date Value Ref Range Status   10/03/2018 42 %    05/24/2017 39 %      No results found for: 20029  FEFMax-Pred   Date Value Ref Range Status   10/03/2018 9.04 L/sec    05/24/2017 9.14 L/sec      FEFMax-Pre   Date Value Ref Range Status   10/03/2018 3.71 L/sec    05/24/2017 3.91 L/sec       FEFMax-%Pred-Pre   Date Value Ref Range Status   10/03/2018 40 %    05/24/2017 42 %      ExpTime-Pre   Date Value Ref Range Status   10/03/2018 13.41 sec    05/24/2017 18.90 sec      FIFMax-Pre   Date Value Ref Range Status   10/03/2018 5.72 L/sec    05/24/2017 4.64 L/sec      FEV1FEV6-Pred   Date Value Ref Range Status   10/03/2018 79 %    05/24/2017 79 %      FEV1FEV6-Pre   Date Value Ref Range Status   10/03/2018 49 %    05/24/2017 47 %      No results found for: 20055         Previous Chest Imaging   No images are attached to the encounter.  No images are attached to the encounter or orders placed in the encounter.         Previous Cardiology Imaging   No results found for this or any previous visit (from the past 8760 hour(s)).

## 2019-10-28 NOTE — LETTER
10/28/2019       RE: Blayne Gonzalez  [address]  [facility] [address]     Dear Colleague,    Thank you for referring your patient, Blayne Gonzalez, to the [facility] at [facility]. Please see a copy of my visit note below.    LUNG NODULE & INTERVENTIONAL PULMONARY CLINIC  CL[facility]ICS [facility] SURGERY [facility]ENTER, [facility], [facility]     Blayne Gonzalez MRN# [id number]   Age: 63 year old YOB: 1955     Reason for Consultation: lung nodule(s)  Looks like a granuloma.  He has many already.  Plan for CT surveillance.  Will discuss with his primary pulmonologist.  Requesting Physician: Raymond Brannon MD  [facility]  [address] [place] [address]       Assessment and Plan:    1. New multiple pulmonary lung nodule(s). Given the characteristics on current/previous imaging and risk factors; I would classify this to be Low (<6%) risk for cancer.   This is technically a new large nodule but is characteristic of a granuloma.  Will discuss with primary pulmonologist.    Follow up CT, discuss timing with primary pulmonologist.    1 new problem (new nodule) with further workup       History:     Blayne Gonzalez is a 63 year old male with sig h/o for tobacco use who is here for evaluation/followup of lung nodule(s).  He had lung cancer screening and a new nodule was noted.  He had nodules monitored for several years as well as a biopsy, previously.  He is not smoking.  He has no other provocative, palliative or localizing factors. No timing factors.    - My interpretation of the images relevant for this visit includes: new upper lobe granuloma in setting of many calcified nodules.  - My interpretation of the PFT's relevant for this visit includes: Obstructive pattern            Past Medical History:      Past Medical History:   Diagnosis Date     COPD (chronic obstructive pulmonary disease) (H)      Pulmonary  nodule            Past Surgical History:      Past Surgical History:   Procedure Laterality Date     cyst removed from nipple            Social History:     Social History     Tobacco Use     Smoking status: Former Smoker     Packs/day: 0.80     Years: 38.00     Pack years: 30.40     Types: Cigarettes     Last attempt to quit: 10/5/2015     Years since quittin.0     Smokeless tobacco: Never Used   Substance Use Topics     Alcohol use: Yes     Alcohol/week: 0.0 standard drinks     Comment: one beer every few months          Family History:   No family history of lung disease.       Allergies:    No Known Allergies       Medications:     Current Outpatient Medications   Medication Sig     albuterol (PROAIR HFA/PROVENTIL HFA/VENTOLIN HFA) 108 (90 Base) MCG/ACT inhaler Inhale 2 puffs into the lungs every 6 hours as needed for shortness of breath / dyspnea or wheezing     dextromethorphan-guaiFENesin (MUCINEX DM)  MG per 12 hr tablet Take 1 tablet by mouth as needed      fluticasone-salmeterol (ADVAIR) 100-50 MCG/DOSE inhaler Inhale 1 puff into the lungs 2 times daily     ibuprofen (ADVIL,MOTRIN) 600 MG tablet Take 600 mg by mouth every 6 hours as needed.     sildenafil (VIAGRA) 100 MG tablet Take 1 tablet (100 mg) by mouth daily as needed (for erectile dysfunction)     gabapentin (NEURONTIN) 300 MG capsule Take 1 capsule (300 mg) by mouth nightly as needed (back spasm) (Patient not taking: Reported on 10/28/2019)     naproxen (NAPROSYN) 500 MG tablet Take 1 tablet (500 mg) by mouth 2 times daily (with meals) (Patient not taking: Reported on 10/28/2019)     No current facility-administered medications for this visit.           Review of Systems:     CONSTITUTIONAL: negative for fever, chills, change in weight  INTEGUMENTARY/SKIN: no rash or obvious new lesions  ENT/MOUTH: no sore throat, new sinus pain or nasal drainage  RESP: see interval history  CV: negative for chest pain, palpitations or peripheral  edema  GI: no nausea, vomiting, change in stools  : no dysuria  MUSCULOSKELETAL: no myalgias, arthralgias  ENDOCRINE: blood sugars with adequate control  PSYCHIATRIC: mood stable  LYMPHATIC: no new lymphadenopathy  HEME: no bleeding or easy bruisability  NEURO: no numbness, weakness, headaches         Physical Exam:     Temp:  [97.5  F (36.4  C)] 97.5  F (36.4  C)  Pulse:  [90] 90  Resp:  [14] 14  BP: (137)/(89) 137/89  SpO2:  [96 %] 96 %  Wt Readings from Last 4 Encounters:   10/28/19 57.7 kg (127 lb 3.2 oz)   10/07/19 57 kg (125 lb 9.6 oz)   08/15/19 57.2 kg (126 lb)   07/24/19 57.2 kg (126 lb)     Constitutional:   Awake, alert and in no apparent distress     Eyes:   Nonicteric, SANTY     ENT:    Trachea is midline. No gross neck abnormalities      Neck:   Supple without supraclavicular or cervical lymphadenopathy     Lungs:   Good air flow.  No crackles. No rhonchi.  No wheezes.     Cardiovascular:   Normal S1 and S2.  RRR.  No murmur, gallop or rub.  Radial, DP and PT pulses normal and symmetric     Abdomen:   NABS, soft, nontender, nondistended.  No HSM.     Musculoskeletal:   No edema.      Neurologic:   Alert and conversant. Cranial nerves  intact.       Skin:   Warm, dry.  No rash on limited exam.           Current Laboratory Data:   BUN 13, Cr 0.74  No results found for this or any previous visit (from the past 24 hour(s)).         Previous Pulmonary Function Testing     FVC-Pred   Date Value Ref Range Status   10/03/2018 4.17 L    05/24/2017 4.22 L      FVC-Pre   Date Value Ref Range Status   10/03/2018 3.79 L    05/24/2017 3.47 L      FVC-%Pred-Pre   Date Value Ref Range Status   10/03/2018 90 %    05/24/2017 82 %      FEV1-Pre   Date Value Ref Range Status   10/03/2018 1.59 L    05/24/2017 1.37 L      FEV1-%Pred-Pre   Date Value Ref Range Status   10/03/2018 48 %    05/24/2017 41 %      FEV1FVC-Pred   Date Value Ref Range Status   10/03/2018 75 %    05/24/2017 78 %      FEV1FVC-Pre   Date Value Ref  Range Status   10/03/2018 42 %    05/24/2017 39 %      No results found for: 20029  FEFMax-Pred   Date Value Ref Range Status   10/03/2018 9.04 L/sec    05/24/2017 9.14 L/sec      FEFMax-Pre   Date Value Ref Range Status   10/03/2018 3.71 L/sec    05/24/2017 3.91 L/sec      FEFMax-%Pred-Pre   Date Value Ref Range Status   10/03/2018 40 %    05/24/2017 42 %      ExpTime-Pre   Date Value Ref Range Status   10/03/2018 13.41 sec    05/24/2017 18.90 sec      FIFMax-Pre   Date Value Ref Range Status   10/03/2018 5.72 L/sec    05/24/2017 4.64 L/sec      FEV1FEV6-Pred   Date Value Ref Range Status   10/03/2018 79 %    05/24/2017 79 %      FEV1FEV6-Pre   Date Value Ref Range Status   10/03/2018 49 %    05/24/2017 47 %      No results found for: 20055         Previous Chest Imaging   No images are attached to the encounter.  No images are attached to the encounter or orders placed in the encounter.         Previous Cardiology Imaging   No results found for this or any previous visit (from the past 8760 hour(s)).             Again, thank you for allowing me to participate in the care of your patient.      Sincerely,    Gagan Carrington MD

## 2019-10-28 NOTE — NURSING NOTE
"Oncology Rooming Note    October 28, 2019 3:29 PM   Blayne Gonzalez is a 63 year old male who presents for:    Chief Complaint   Patient presents with     Oncology Clinic Visit     New Lung Nodule     Initial Vitals: /89   Pulse 90   Temp 97.5  F (36.4  C) (Oral)   Resp 14   Ht 1.759 m (5' 9.25\")   Wt 57.7 kg (127 lb 3.2 oz)   SpO2 96%   BMI 18.65 kg/m   Estimated body mass index is 18.65 kg/m  as calculated from the following:    Height as of this encounter: 1.759 m (5' 9.25\").    Weight as of this encounter: 57.7 kg (127 lb 3.2 oz). Body surface area is 1.68 meters squared.  No Pain (0) Comment: Data Unavailable   No LMP for male patient.  Allergies reviewed: Yes  Medications reviewed: Yes    Medications: Medication refills not needed today.  Pharmacy name entered into [employer]:    [id number] - [place], [place] - [address] [place], [place] [address]A AVE AT [facility] & [address] [place] [address] PHARMACY #[id number] [place], [place] - [address] [place] [address]    Clinical concerns: Patient has no new concerns since referral        Cristopher Olivas, EMT              "

## 2019-10-30 DIAGNOSIS — Z87.891 PERSONAL HISTORY OF TOBACCO USE: Primary | ICD-10-CM

## 2019-12-30 ENCOUNTER — TELEPHONE (OUTPATIENT)
Dept: PULMONOLOGY | Facility: CLINIC | Age: 64
End: 2019-12-30

## 2019-12-30 DIAGNOSIS — J44.9 COPD (CHRONIC OBSTRUCTIVE PULMONARY DISEASE) (H): Primary | ICD-10-CM

## 2019-12-30 RX ORDER — DOXYCYCLINE 100 MG/1
100 CAPSULE ORAL 2 TIMES DAILY
Qty: 14 CAPSULE | Refills: 1 | Status: SHIPPED | OUTPATIENT
Start: 2019-12-30 | End: 2020-01-06

## 2019-12-30 RX ORDER — PREDNISONE 20 MG/1
40 TABLET ORAL DAILY
Qty: 10 TABLET | Refills: 1 | Status: SHIPPED | OUTPATIENT
Start: 2019-12-30 | End: 2020-01-04

## 2019-12-30 NOTE — TELEPHONE ENCOUNTER
Pharmacy requesting to refill Doxycycline 50MG and Prednisone 20mg .  This medication is not on the patient's list.    Pharmacy: [facility]  Phone: [phone]  Fax: [phone]

## 2019-12-30 NOTE — TELEPHONE ENCOUNTER
Pt reports productive cough with green sputum and chest tightness for last couple of days, would like to initiate home action plan per Dr. Brannon, however, Rx for Doxy and prednisone have .  Pt requesting new Rx to be sent to [address] and [place].  Plan: Rx sent as requested per Dr. Brannon' last office note.  Advised pt to call back with new or worsening symptoms.

## 2020-02-12 ENCOUNTER — TELEPHONE (OUTPATIENT)
Dept: PULMONOLOGY | Facility: CLINIC | Age: 65
End: 2020-02-12

## 2020-02-12 DIAGNOSIS — J42 CHRONIC BRONCHITIS, UNSPECIFIED CHRONIC BRONCHITIS TYPE (H): ICD-10-CM

## 2020-02-12 NOTE — TELEPHONE ENCOUNTER
Health Call Center    Phone Message    May a detailed message be left on voicemail: yes     Reason for Call: Medication Question or concern regarding medication   Prescription Clarification  Name of Medication: ticasone-salmeterol (ADVAIR) 100-50 MCG/DOSE inhaler  Prescribing Provider: Dr. Brannon   Pharmacy:    Pt would like to speak to a nurse before refilling this prescription because pt recently lost his insurance.      What on the order needs clarification? Pt would like to speak to a nurse before refilling this prescription because pt recently lost his insurance. Pt wants it refill in [place] but need to speak to clinic first.           Action Taken: Message routed to:  [facility] ([facility] Pulmonary    Travel Screening: Not Applicable

## 2020-02-13 ENCOUNTER — TELEPHONE (OUTPATIENT)
Dept: INTERNAL MEDICINE | Facility: CLINIC | Age: 65
End: 2020-02-13

## 2020-02-13 DIAGNOSIS — N52.9 ERECTILE DYSFUNCTION, UNSPECIFIED ERECTILE DYSFUNCTION TYPE: ICD-10-CM

## 2020-02-13 RX ORDER — SILDENAFIL 100 MG/1
100 TABLET, FILM COATED ORAL DAILY PRN
Qty: 15 TABLET | Refills: 2 | Status: SHIPPED | OUTPATIENT
Start: 2020-02-13 | End: 2020-05-13

## 2020-02-13 NOTE — TELEPHONE ENCOUNTER
Faxed signed inhaler order to [facility] [place] pharmacy at [phone].    Left patient message that inhaler faxed.

## 2020-02-14 ENCOUNTER — OFFICE VISIT (OUTPATIENT)
Dept: INTERNAL MEDICINE | Facility: CLINIC | Age: 65
End: 2020-02-14
Payer: COMMERCIAL

## 2020-02-14 VITALS
TEMPERATURE: 97.5 F | HEART RATE: 72 BPM | DIASTOLIC BLOOD PRESSURE: 76 MMHG | RESPIRATION RATE: 18 BRPM | SYSTOLIC BLOOD PRESSURE: 133 MMHG | BODY MASS INDEX: 19.83 KG/M2 | WEIGHT: 133.9 LBS | OXYGEN SATURATION: 95 % | HEIGHT: 69 IN

## 2020-02-14 DIAGNOSIS — N52.9 ERECTILE DYSFUNCTION, UNSPECIFIED ERECTILE DYSFUNCTION TYPE: Primary | ICD-10-CM

## 2020-02-14 DIAGNOSIS — J42 CHRONIC BRONCHITIS, UNSPECIFIED CHRONIC BRONCHITIS TYPE (H): ICD-10-CM

## 2020-02-14 ASSESSMENT — MIFFLIN-ST. JEOR: SCORE: 1397.75

## 2020-02-14 ASSESSMENT — PAIN SCALES - GENERAL: PAINLEVEL: NO PAIN (0)

## 2020-02-14 NOTE — NURSING NOTE
Chief Complaint   Patient presents with     Recheck Medication     pt. is here to renew scripts       Jodie Ford, EMT

## 2020-02-14 NOTE — TELEPHONE ENCOUNTER
sildenafil (VIAGRA) 100 MG tablet  Last Written Prescription Date:  5/10/2019  Last Fill Quantity: 15,   # refills: 9  Last Office Visit : 3/19/2019  Future Office visit:  None     Appt due for refills. 90 day maryana / 0 refils sent.  Scheduling has been notified to contact the pt for appointment.

## 2020-02-14 NOTE — TELEPHONE ENCOUNTER
[facility] Call Center    Phone Message    May a detailed message be left on voicemail: yes     Reason for Call: Other: Pt called and scheduled and appt for today with raiza Song needing to know if he will be able to get the refill of the sidenafil today at that appt for another year     Action Taken: Message routed to:  [facility] pcc    Travel Screening: Not Applicable

## 2020-02-14 NOTE — TELEPHONE ENCOUNTER
I called and spoke with patient. He recently retired so he does not currently have insurance. So if he came in for an appointment, he would have to pay out of pocket. Can you please call patient and discuss. He still does need the refill though.

## 2020-02-14 NOTE — PATIENT INSTRUCTIONS
Your health care Provider has recommended that you receive the new Shingle vaccine called Shingrix to prevent shingles for ages 50 and above. Many private insurance and Medicare Part D plans cover Shingrix. However, not all insurance carriers cover the entire cost of the Shingrix vaccine if the vaccine is administered at your primary care clinic. The clinic cannot determine your insurance benefits.  Please call your insurance carrier prior. The vaccine comes in two doses. Your second dose should be 2-6 months from your first dose.       Prior to receiving the vaccine, we recommend that you call your insurance carrier and ask them the following questions:            1. Is there a cost difference if I receive the vaccine at my doctor's office or a pharmacy?          2. Does my insurance cover the Shingrix Vaccine and administration of the vaccine?          3. What is my co-pay or deductible for the vaccine?        Please call to schedule a Nurse-Visit only at [phone].  Nurse Visit hours are available Monday, Wednesday, and Friday from 9:00 AM-11:00 AM and 1:00 PM-3:00 PM.       Primary Care Center Medication Refill Request Information:  * Please contact your pharmacy regarding ANY request for medication refills.  ** [facility] Prescription Fax = [phone]  * Please allow 3 business days for routine medication refills.  * Please allow 5 business days for controlled substance medication refills.     Primary Care Center Test Result notification information:  *You will be notified with in 7-10 days of your appointment day regarding the results of your test.  If you are on MyChart you will be notified as soon as the provider has reviewed the results and signed off on them.

## 2020-02-14 NOTE — TELEPHONE ENCOUNTER
Called him to advise the RX was sent yesterday for a short fill and he needed an exam to get more refills in the future. He will come today  Zaira Jenkins Paramedic on 2/14/2020 at 10:23 AM

## 2020-02-14 NOTE — PROGRESS NOTES
[facility]  Primary Care Center   Nohemi LRoseann Centeno, SHARRI MCKAY  02/16/2020      Chief Complaint:   Recheck Medication       History of Present Illness:   Blayne Gonzalez is a 64 year old male with a history of COPD who presents for a medication recheck.  Blayne needs a refill for his albuterol inhaler. He uses the inhaler as needed, up to once daily, but typically uses this infrequently. He continues to use Advair, 1 puff 2x daily, which helps keep his symptoms at bay. He does still experience shortness of breath, but not worse than baseline. He quit smoking 6 years ago and is not currently smoking. He is only using mucinex as needed, usually when he has a cold. He continues to take Sildenafil 100 mg as needed, and has not been experiencing any side effects. He is no longer taking Gabapentin, which he was prescribed last summer after a back injury. He does not exercise regularly, but has a very active job in construction. He denies chest pain when going up a flight of stairs and declines need an STD screening    Review of Systems:   Pertinent items are noted in HPI, remainder of complete ROS is negative.      Active Medications:      albuterol (PROAIR HFA/PROVENTIL HFA/VENTOLIN HFA) 108 (90 Base) MCG/ACT inhaler, Inhale 2 puffs into the lungs every 6 hours as needed for shortness of breath / dyspnea or wheezing, Disp: 1 Inhaler, Rfl: 11     dextromethorphan-guaiFENesin (MUCINEX DM)  MG per 12 hr tablet, Take 1 tablet by mouth as needed , Disp: , Rfl:      fluticasone-salmeterol (ADVAIR) 100-50 MCG/DOSE inhaler, Inhale 1 puff into the lungs 2 times daily, Disp: 3 Inhaler, Rfl: 3     gabapentin (NEURONTIN) 300 MG capsule, Take 1 capsule (300 mg) by mouth nightly as needed (back spasm), Disp: 30 capsule, Rfl: 1     ibuprofen (ADVIL,MOTRIN) 600 MG tablet, Take 600 mg by mouth every 6 hours as needed., Disp: , Rfl:      naproxen (NAPROSYN) 500 MG tablet, Take 1 tablet (500 mg) by mouth 2 times daily (with meals), Disp:  "28 tablet, Rfl: 0     sildenafil (VIAGRA) 100 MG tablet, Take 1 tablet (100 mg) by mouth daily as needed (for erectile dysfunction) Return to Primary care for further refills [phone], Disp: 15 tablet, Rfl: 2      Allergies:   Patient has no known allergies.      Past Medical History:  COPD  Pulmonary nodule     Past Surgical History:  Cyst removal, nipple    Social History:   Unmarried  Former 0.8 PPD smoker for 38 years    Physical Exam:   /76 (BP Location: Right arm, Patient Position: Chair, Cuff Size: Adult Large)   Pulse 72   Temp 97.5  F (36.4  C) (Oral)   Resp 18   Ht 1.753 m (5' 9\")   Wt 60.7 kg (133 lb 14.4 oz)   SpO2 95%   BMI 19.77 kg/m     Constitutional: Alert, oriented, pleasant, no acute distress  Head: Normocephalic, atraumatic  Eyes: Extra-ocular movements intact, pupils equally round and reactive bilaterally, no scleral icterus  Ears: tympanic membranes pearly gray with positive light reflex  ENT: Oropharynx clear, moist mucus membranes, good dentition  Neck: Supple, no lymphadenopathy, no thyromegaly  Cardiovascular: Regular rate and rhythm, no murmurs, rubs or gallops, peripheral pulses full/symmetric  Respiratory: Good air movement bilaterally, lungs clear, no wheezes/rales/rhonchi  Psychiatric: normal mentation, affect and mood     Assessment and Plan:  Erectile dysfunction, unspecified erectile dysfunction type  Refill for sildenafil sent to his pharmacy yesterday; this has been working well for him, denies side effects. No other urinary symptoms or  complaints.    Chronic bronchitis, unspecified chronic bronchitis type (H)  Well-controlled on current regimen; continue to follow with pulmonology.          Follow-up: prn        Scribe Disclosure:  Bella ESTRELLA, am serving as a scribe to document services personally performed by SHARRI Meredith CNP at this visit, based upon the provider's statements to me. All documentation has been reviewed by the " aforementioned provider prior to being entered into the official medical record.    Portions of this medical record were completed by a scribe. UPON MY REVIEW AND AUTHENTICATION BY ELECTRONIC SIGNATURE, this confirms (a) I performed the applicable clinical services, and (b) the record is accurate.     SHARRI Meredith CNP

## 2020-05-11 DIAGNOSIS — N52.9 ERECTILE DYSFUNCTION, UNSPECIFIED ERECTILE DYSFUNCTION TYPE: ICD-10-CM

## 2020-05-13 RX ORDER — SILDENAFIL 100 MG/1
100 TABLET, FILM COATED ORAL DAILY PRN
Qty: 15 TABLET | Refills: 8 | Status: SHIPPED | OUTPATIENT
Start: 2020-05-13 | End: 2020-08-06

## 2020-08-06 ENCOUNTER — TELEPHONE (OUTPATIENT)
Dept: INTERNAL MEDICINE | Facility: CLINIC | Age: 65
End: 2020-08-06

## 2020-08-06 DIAGNOSIS — N52.9 ERECTILE DYSFUNCTION, UNSPECIFIED ERECTILE DYSFUNCTION TYPE: ICD-10-CM

## 2020-08-06 RX ORDER — SILDENAFIL 100 MG/1
100 TABLET, FILM COATED ORAL DAILY PRN
Qty: 15 TABLET | Refills: 8 | Status: SHIPPED | OUTPATIENT
Start: 2020-08-06 | End: 2021-08-23

## 2020-08-06 NOTE — TELEPHONE ENCOUNTER
Health Call Center    Phone Message    May a detailed message be left on voicemail: yes     Reason for Call: Medication Refill Request    Has the patient contacted the pharmacy for the refill? Yes   Name of medication being requested: sildenafil (VIAGRA) 100 MG tablet  Provider who prescribed the medication: Nohemi Centeno  Pharmacy: [id number] - [place], [place] - [address]   Date medication is needed: Patient states that there are no refills and requesting someone call the pharmacy, or send another prescription. The prescription that was sent on 5/13/2020 had 8 refills. Patient was seen on 2/14/2020 by Nohemi Centeno and was told that he would not need to be seen for another year. Please call if that is not the case.      Action Taken: Message routed to:  [facility] ([facility]    Travel Screening: Not Applicable

## 2020-09-17 ENCOUNTER — VIRTUAL VISIT (OUTPATIENT)
Dept: URGENT CARE | Facility: CLINIC | Age: 65
End: 2020-09-17
Payer: COMMERCIAL

## 2020-09-17 DIAGNOSIS — Z20.822 EXPOSURE TO COVID-19 VIRUS: Primary | ICD-10-CM

## 2020-09-17 PROCEDURE — 99213 OFFICE O/P EST LOW 20 MIN: CPT | Mod: 95 | Performed by: INTERNAL MEDICINE

## 2020-09-18 NOTE — PROGRESS NOTES
"  Blayne Gonzalez is a 64 year old male who is being evaluated via a billable telephone visit.      The patient has been notified of following:     \"This telephone visit will be conducted via a call between you and your physician/provider. We have found that certain health care needs can be provided without the need for a physical exam.  This service lets us provide the care you need with a short phone conversation.  If a prescription is necessary we can send it directly to your pharmacy.  If lab work is needed we can place an order for that and you can then stop by our lab to have the test done at a later time.    If during the course of the call the physician/provider feels a telephone visit is not appropriate, you will not be charged for this service.\"     Patient has given verbal consent for Telephone visit?  Yes    SUBJECTIVE:  Blayne Gonzalez is an 64 year old male who presents for covid exposure.  Is caretaker/manager of apartment building.   Has checked on his neighbor a few times recently and has been within 6 feet of him.  Today learned that the neighbor has had covid and is in hospital with that.  He doesn't have symptoms of cough or shortness of breath or fever or body aches.  He does have h/o copd but no recent breathing changes.    PMH:   has a past medical history of COPD (chronic obstructive pulmonary disease) (H) and Pulmonary nodule.  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     Social History     Socioeconomic History     Marital status: Single     Spouse name: Not on file     Number of children: Not on file     Years of education: Not on file     Highest education level: Not on file   Occupational History     Not on file   Social Needs     Financial resource strain: Not on file     Food insecurity     Worry: Not on file     Inability: Not on file     Transportation needs "     Medical: Not on file     Non-medical: Not on file   Tobacco Use     Smoking status: Former Smoker     Packs/day: 0.80     Years: 38.00     Pack years: 30.40     Types: Cigarettes     Last attempt to quit: 10/5/2015     Years since quittin.9     Smokeless tobacco: Never Used   Substance and Sexual Activity     Alcohol use: Yes     Alcohol/week: 0.0 standard drinks     Comment: one beer every few months     Drug use: Yes     Comment: history  of marijuana use within the year     Sexual activity: Yes     Partners: Female   Lifestyle     Physical activity     Days per week: Not on file     Minutes per session: Not on file     Stress: Not on file   Relationships     Social connections     Talks on phone: Not on file     Gets together: Not on file     Attends Episcopal service: Not on file     Active member of club or organization: Not on file     Attends meetings of clubs or organizations: Not on file     Relationship status: Not on file     Intimate partner violence     Fear of current or ex partner: Not on file     Emotionally abused: Not on file     Physically abused: Not on file     Forced sexual activity: Not on file   Other Topics Concern     Parent/sibling w/ CABG, MI or angioplasty before 65F 55M? Not Asked   Social History Narrative    The patient has a 40 pk yr tobacco hx.  He has no ongoing active use.  Alcohol use is 1 alcoholic drinks per week.  He does use of occasional marijuana gummies.          He is employed as a hernandez and builder.         The patient is single.  Has 0 children.        Sexually active with one female partners.      No family history on file.    ALLERGIES:  Patient has no known allergies.    Current Outpatient Medications   Medication     albuterol (PROAIR HFA/PROVENTIL HFA/VENTOLIN HFA) 108 (90 Base) MCG/ACT inhaler     dextromethorphan-guaiFENesin (MUCINEX DM)  MG per 12 hr tablet     fluticasone-salmeterol (ADVAIR) 100-50 MCG/DOSE inhaler     gabapentin (NEURONTIN)  300 MG capsule     ibuprofen (ADVIL,MOTRIN) 600 MG tablet     naproxen (NAPROSYN) 500 MG tablet     sildenafil (VIAGRA) 100 MG tablet     No current facility-administered medications for this visit.          ROS:  ROS is done and is negative for general/constitutional, eye, ENT, Respiratory, cardiovascular, GI, , Skin, musculoskeletal except as noted elsewhere.  All other review of systems negative except as noted elsewhere.      OBJECTIVE:  There were no vitals taken for this visit.  GENERAL : Alert and oriented.  Did not seem to be in distress.   RESP: No respiratory distress detected during phone conversation         ASSESSMENT/PLAN:    ASSESSMENT / PLAN:  (Z20.828) Exposure to COVID-19 virus  (primary encounter diagnosis)  Comment: neighbor with covid and was around him  Plan: Asymptomatic COVID-19 Virus (Coronavirus) by         PCR        Reviewed testing scheduling.  Reviewed isolation/quarantine. I reviewed supportive care, otc meds to use if needed, expected course, and signs of concern.  Follow up as needed or if he develops sxs.  Reviewed red flag symptoms and is to go to the ER if experiences any of these.        See [facility] for orders, medications, letters, patient instructions    Sylvie Heard MD  9/17/2020, 7:14 PM      Phone call duration:  13 minutes

## 2020-09-19 DIAGNOSIS — Z20.822 EXPOSURE TO COVID-19 VIRUS: ICD-10-CM

## 2020-09-19 PROCEDURE — U0003 INFECTIOUS AGENT DETECTION BY NUCLEIC ACID (DNA OR RNA); SEVERE ACUTE RESPIRATORY SYNDROME CORONAVIRUS 2 (SARS-COV-2) (CORONAVIRUS DISEASE [COVID-19]), AMPLIFIED PROBE TECHNIQUE, MAKING USE OF HIGH THROUGHPUT TECHNOLOGIES AS DESCRIBED BY CMS-2020-01-R: HCPCS | Performed by: INTERNAL MEDICINE

## 2020-09-20 LAB
SARS-COV-2 RNA SPEC QL NAA+PROBE: NOT DETECTED
SPECIMEN SOURCE: NORMAL

## 2020-09-21 ENCOUNTER — NURSE TRIAGE (OUTPATIENT)
Dept: NURSING | Facility: CLINIC | Age: 65
End: 2020-09-21

## 2020-09-21 NOTE — TELEPHONE ENCOUNTER
Patient calling for COVID test results from 9/19. The following information was reviewed:    Coronavirus (COVID-19) Notification    Lab Result   Lab test 2019-nCoV rRt-PCR OR SARS-COV-2 PCR    Nasopharyngeal AND/OR Oropharyngeal swab is NEGATIVE for 2019-nCoV RNA [OR] SARS-COV-2 RNA (COVID-19) RNA    Your result was negative. This means that we didn't find the virus that causes COVID-19 in your sample. A test may show negative when you do actually have the virus. This can happen when the virus is in the early stages of infection, before you feel illness symptoms.    If you have symptoms   Stay home and away from others (self-isolate) until you meet ALL of the guidelines below:    You've had no fever--and no medicine that reduces fever--for 1 full day (24 hours). And      Your other symptoms have gotten better. For example, your cough or breathing has improved. And   ; At least 10 days have passed since your symptoms started. (If you've been told by a doctor that you have a weak immune system, wait 20 days.)         During this time:    Stay home. Don't go to work, school or anywhere else.     Stay in your own room, including for meals. Use your own bathroom if you can.    Stay away from others in your home. No hugging, kissing or shaking hands. No visitors.    Clean  high touch  surfaces often (doorknobs, counters, handles, etc.). Use a household cleaning spray or wipes. You can find a full list on the EPA website at www.epa.gov/pesticide-registration/list-n-disinfectants-use-against-sars-cov-2.    Cover your mouth and nose with a mask, tissue or other face covering to avoid spreading germs.    Wash your hands and face often with soap and water.    Going back to work  Check with your employer for any guidelines to follow for going back to work.  You are sent a letter for your Employer which will serve as formal document notice that you, the employee, tested negative for COVID-19, as of the testing date shown  above.    If your symptoms worsen or other concerning symptoms, contact PCP, oncare or consider returning to Emergency Dept.    Where can I get more information?    [facility]: [email or web address]    Coronavirus Basics: www.health.[email or web address].[email or web address]/diseases/coronavirus/basics.html    [facility] Hotline (693-301-6291)    Zadnra Maurice RN  [facility]    Additional Information    Health Information question, no triage required and triager able to answer question    Protocols used: INFORMATION ONLY CALL-A-AH

## 2020-10-12 ENCOUNTER — ANCILLARY PROCEDURE (OUTPATIENT)
Dept: CT IMAGING | Facility: CLINIC | Age: 65
End: 2020-10-12
Attending: INTERNAL MEDICINE
Payer: COMMERCIAL

## 2020-10-12 ENCOUNTER — OFFICE VISIT (OUTPATIENT)
Dept: PULMONOLOGY | Facility: CLINIC | Age: 65
End: 2020-10-12
Attending: INTERNAL MEDICINE
Payer: COMMERCIAL

## 2020-10-12 VITALS — SYSTOLIC BLOOD PRESSURE: 131 MMHG | HEART RATE: 69 BPM | DIASTOLIC BLOOD PRESSURE: 86 MMHG | OXYGEN SATURATION: 97 %

## 2020-10-12 DIAGNOSIS — Z87.891 PERSONAL HISTORY OF TOBACCO USE: ICD-10-CM

## 2020-10-12 DIAGNOSIS — Z23 ENCOUNTER FOR VACCINATION: ICD-10-CM

## 2020-10-12 DIAGNOSIS — J44.9 CHRONIC OBSTRUCTIVE PULMONARY DISEASE, UNSPECIFIED COPD TYPE (H): Primary | ICD-10-CM

## 2020-10-12 PROCEDURE — G0297 LDCT FOR LUNG CA SCREEN: HCPCS | Mod: GC | Performed by: RADIOLOGY

## 2020-10-12 PROCEDURE — G0008 ADMIN INFLUENZA VIRUS VAC: HCPCS | Performed by: INTERNAL MEDICINE

## 2020-10-12 PROCEDURE — 99214 OFFICE O/P EST MOD 30 MIN: CPT | Performed by: INTERNAL MEDICINE

## 2020-10-12 PROCEDURE — G0463 HOSPITAL OUTPT CLINIC VISIT: HCPCS | Mod: 25

## 2020-10-12 PROCEDURE — 90682 RIV4 VACC RECOMBINANT DNA IM: CPT | Performed by: INTERNAL MEDICINE

## 2020-10-12 PROCEDURE — 250N000011 HC RX IP 250 OP 636: Performed by: INTERNAL MEDICINE

## 2020-10-12 PROCEDURE — G0008 ADMIN INFLUENZA VIRUS VAC: HCPCS

## 2020-10-12 RX ORDER — ALBUTEROL SULFATE 90 UG/1
2 AEROSOL, METERED RESPIRATORY (INHALATION) EVERY 6 HOURS PRN
Qty: 1 INHALER | Refills: 3 | Status: SHIPPED | OUTPATIENT
Start: 2020-10-12 | End: 2020-11-11

## 2020-10-12 RX ADMIN — INFLUENZA A VIRUS A/MICHIGAN/45/2015 (H1N1) RECOMBINANT HEMAGGLUTININ ANTIGEN, INFLUENZA A VIRUS A/SINGAPORE/INFIMH-16-0019/2016 (H3N2) RECOMBINANT HEMAGGLUTININ ANTIGEN, INFLUENZA B VIRUS B/MARYLAND/15/2016 RECOMBINANT HEMAGGLUTININ ANTIGEN, AND INFLUENZA B VIRUS B/PHUKET/3073/2013 RECOMBINANT HEMAGGLUTININ ANTIGEN 0.5 ML: 45; 45; 45; 45 INJECTION INTRAMUSCULAR at 16:21

## 2020-10-12 NOTE — PROGRESS NOTES
"[facility]  Clinic Follow-Up Visit Note    Reason for visit: \"COPD\"    HPI/Interval History: 63-year-old male with history of COPD and multiple pulmonary nodules was seen for follow-up of COPD.      Patient was last seen by me in October 2019.  At that time he was doing quite well from a respiratory standpoint.  His lung cancer screening CT chest at that time showed multiple pulmonary nodules as well as a partially calcified nodule with interval enlargement when compared to previous CT in 2012.  Following discussion with interventional pulmonary, a PET CT was done which showed mild FDG uptake in previously noted pulmonary nodules (largest was 2.1 cm).  He was seen in interventional pulmonary clinic soon after and given the appearance of the nodules and risk factors he was considered low risk (<6%) for cancer.  I discussed the case extensively with Dr. Carrington and plan was to repeat CT lung cancer screening in 1 years time.  Repeat CT lung cancer screening today shows what appear to be stable previously noted pulmonary nodules (officical radiology read is pending).    As far as his COPD, he reports doing quite well. He did have an exacerbation in December that required abx and prednisone but was able to bounce back to his respiratory baseline. Previously had been on Breo but now on Advair and taking twice a day without issue.  He uses albuterol relatively infrequently but when he does find it helps quite a bit.  No further exacerbation since December.  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    PMH:  Past Medical History:   Diagnosis Date     COPD (chronic obstructive pulmonary disease) (H)      Pulmonary nodule        Allergies:  No Known Allergies    Social History:  Social History     Socioeconomic History     Marital status: Single     Spouse name: Not on file     Number of children: Not on file     Years of education: Not on file     Highest education " level: Not on file   Occupational History     Not on file   Social Needs     Financial resource strain: Not on file     Food insecurity     Worry: Not on file     Inability: Not on file     Transportation needs     Medical: Not on file     Non-medical: Not on file   Tobacco Use     Smoking status: Former Smoker     Packs/day: 0.80     Years: 38.00     Pack years: 30.40     Types: Cigarettes     Quit date: 10/5/2015     Years since quittin.0     Smokeless tobacco: Never Used   Substance and Sexual Activity     Alcohol use: Yes     Alcohol/week: 0.0 standard drinks     Comment: one beer every few months     Drug use: Yes     Comment: history  of marijuana use within the year     Sexual activity: Yes     Partners: Female   Lifestyle     Physical activity     Days per week: Not on file     Minutes per session: Not on file     Stress: Not on file   Relationships     Social connections     Talks on phone: Not on file     Gets together: Not on file     Attends Scientologist service: Not on file     Active member of club or organization: Not on file     Attends meetings of clubs or organizations: Not on file     Relationship status: Not on file     Intimate partner violence     Fear of current or ex partner: Not on file     Emotionally abused: Not on file     Physically abused: Not on file     Forced sexual activity: Not on file   Other Topics Concern     Parent/sibling w/ CABG, MI or angioplasty before 65F 55M? Not Asked   Social History Narrative    The patient has a 40 pk yr tobacco hx.  He has no ongoing active use.  Alcohol use is 1 alcoholic drinks per week.  He does use of occasional marijuana gummies.          He is employed as a hernandez and builder.         The patient is single.  Has 0 children.        Sexually active with one female partners.        History of smoking: YES  Active user: NO   Pack years: 40  Quit:     History of alcohol use: NO  Amount: none  Active user: NO      History of recreational drug  use: NO  Active user: NO    He is employed as a hernandez. Retiring in the next 2 months.       The patient is single.  Has no children.    Hot Tub Exposure: NO  Recent Travel:  NO   Hx of incarceration:  NO  Bird Exposure:   NO  Animal Exposure:  NO  Inhalation Exposure:  YES, sheet metal grinding dust  Hobbies:   NO    Medications:  Current Outpatient Medications   Medication Sig Dispense Refill     albuterol (PROAIR HFA/PROVENTIL HFA/VENTOLIN HFA) 108 (90 Base) MCG/ACT inhaler Inhale 2 puffs into the lungs every 6 hours as needed for shortness of breath / dyspnea or wheezing 1 Inhaler 3     albuterol (PROAIR HFA/PROVENTIL HFA/VENTOLIN HFA) 108 (90 Base) MCG/ACT inhaler Inhale 2 puffs into the lungs every 6 hours as needed for shortness of breath / dyspnea or wheezing 1 Inhaler 11     dextromethorphan-guaiFENesin (MUCINEX DM)  MG per 12 hr tablet Take 1 tablet by mouth as needed        fluticasone-salmeterol (ADVAIR) 100-50 MCG/DOSE inhaler Inhale 1 puff into the lungs 2 times daily 3 Inhaler 3     gabapentin (NEURONTIN) 300 MG capsule Take 1 capsule (300 mg) by mouth nightly as needed (back spasm) 30 capsule 1     ibuprofen (ADVIL,MOTRIN) 600 MG tablet Take 600 mg by mouth every 6 hours as needed.       naproxen (NAPROSYN) 500 MG tablet Take 1 tablet (500 mg) by mouth 2 times daily (with meals) 28 tablet 0     sildenafil (VIAGRA) 100 MG tablet Take 1 tablet (100 mg) by mouth daily as needed (for erectile dysfunction) 15 tablet 8       Family History:  No family hx of lung cancer   No family hx of COPD or asthma    Review of Systems:  Complete 10 point ROS negative unless mentioned in HPI    Physical Exam:  /86   Pulse 69   SpO2 97%   General:  Adult male;appears stated age; no acute distress; the patient is a good historian  HEENT:  NCAT; EOMI; No icterus; no injection; MMM  Neck: Supple, full range of motion, no lymphadenopathy  Pulm: Decreased breathsounds throughout  CV: RRR, no murmurs, rubs or  gallops  Abdomen: Soft, NT, ND, + BS  Extremities:  No cyanosis or clubbing, no edema  Neuro: Alert and oriented x 3, moves all extremities no obvious weakness  Skin: No skin rashes noted    Labs and Radiology:  CT chest (10/12/20): Personally reviewed by me and shows stable previously noted pulmonary nodules (officical radiology read is pending).     PFT's:    Previous PFTs personally reviewed again. Severe obstruction with mild reduction in DLCO.    Assessment and Plan:  Blayne Gonzalez is a 64-year-old male with history of COPD and multiple pulmonary nodules was seen for follow-up of COPD.      COPD: Severe obstruction on pulmonary function tests with significant bronchodilator response noted on previous test.  He continues to be quite stable from a respiratory standpoint.  Did have one exacerbation roughly 10 months ago but no further incidents since then.  He is maintained on Advair and uses albuterol as needed.  He denies any issues with dyspnea.     -Continue Advair and albuterol as needed  -Home action plan as needed (prednisone 40 mg x 5 days and doxycycline 100 mg twice a day x 7 days)    Pulmonary nodules: multiple noted on lung cancer CT chest in Oct 2019. JUAN C partially calcified nodule has increased in size. Subsequent PET CT showed low FDG uptake. Seen by interventional pulmonary who thought these were low risk for cancer. Repeat CT chest today shows what appears to be stable pulmonary nodules.     -Await official radiology report  -If nodules stable, will repeat CT in 1 year    Vaccinations: up to date on Pneumovax. Flu shot administered today.    Return to clinic in 1 year.    Raymond Brannon MD   of Medicine   Division of Pulmonary, Allergy, Critical Care and Sleep Medicine  [facility]  Pager: [phone]    The above note was dictated using voice recognition software and may include typographical errors. Please contact the author for any clarifications.

## 2020-10-12 NOTE — NURSING NOTE
Chief Complaint   Patient presents with     Follow Up     1yr f/u     Vitals were taken and medications were reconciled.     MARIUM Cisneros

## 2020-10-12 NOTE — LETTER
"    10/12/2020         RE: Blayne Gonzalez  [address]  [facility] [id number]        Dear Colleague,    Thank you for referring your patient, Blayne Gonzalez, to the [facility] [place]. Please see a copy of my visit note below.    [facility]  Clinic Follow-Up Visit Note    Reason for visit: \"COPD\"    HPI/Interval History: 63-year-old male with history of COPD and multiple pulmonary nodules was seen for follow-up of COPD.      Patient was last seen by me in October 2019.  At that time he was doing quite well from a respiratory standpoint.  His lung cancer screening CT chest at that time showed multiple pulmonary nodules as well as a partially calcified nodule with interval enlargement when compared to previous CT in 2012.  Following discussion with interventional pulmonary, a PET CT was done which showed mild FDG uptake in previously noted pulmonary nodules (largest was 2.1 cm).  He was seen in interventional pulmonary clinic soon after and given the appearance of the nodules and risk factors he was considered low risk (<6%) for cancer.  I discussed the case extensively with Dr. Carrington and plan was to repeat CT lung cancer screening in 1 years time.  Repeat CT lung cancer screening today shows what appear to be stable previously noted pulmonary nodules (officical radiology read is pending).    As far as his COPD, he reports doing quite well. He did have an exacerbation in December that required abx and prednisone but was able to bounce back to his respiratory baseline. Previously had been on Breo but now on Advair and taking twice a day without issue.  He uses albuterol relatively infrequently but when he does find it helps quite a bit.  No further exacerbation since December.  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    PMH:  Past Medical History:   Diagnosis Date     COPD (chronic obstructive " pulmonary disease) (H)      Pulmonary nodule        Allergies:  No Known Allergies    Social History:  Social History     Socioeconomic History     Marital status: Single     Spouse name: Not on file     Number of children: Not on file     Years of education: Not on file     Highest education level: Not on file   Occupational History     Not on file   Social Needs     Financial resource strain: Not on file     Food insecurity     Worry: Not on file     Inability: Not on file     Transportation needs     Medical: Not on file     Non-medical: Not on file   Tobacco Use     Smoking status: Former Smoker     Packs/day: 0.80     Years: 38.00     Pack years: 30.40     Types: Cigarettes     Quit date: 10/5/2015     Years since quittin.0     Smokeless tobacco: Never Used   Substance and Sexual Activity     Alcohol use: Yes     Alcohol/week: 0.0 standard drinks     Comment: one beer every few months     Drug use: Yes     Comment: history  of marijuana use within the year     Sexual activity: Yes     Partners: Female   Lifestyle     Physical activity     Days per week: Not on file     Minutes per session: Not on file     Stress: Not on file   Relationships     Social connections     Talks on phone: Not on file     Gets together: Not on file     Attends Restorationism service: Not on file     Active member of club or organization: Not on file     Attends meetings of clubs or organizations: Not on file     Relationship status: Not on file     Intimate partner violence     Fear of current or ex partner: Not on file     Emotionally abused: Not on file     Physically abused: Not on file     Forced sexual activity: Not on file   Other Topics Concern     Parent/sibling w/ CABG, MI or angioplasty before 65F 55M? Not Asked   Social History Narrative    The patient has a 40 pk yr tobacco hx.  He has no ongoing active use.  Alcohol use is 1 alcoholic drinks per week.  He does use of occasional marijuana gummies.          He is employed as  a hernandez and builder.         The patient is single.  Has 0 children.        Sexually active with one female partners.        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       The patient is single.  Has no children.    Hot Tub Exposure: NO  Recent Travel:  NO   Hx of incarceration:  NO  Bird Exposure:   NO  Animal Exposure:  NO  Inhalation Exposure:  YES, sheet metal grinding dust  Hobbies:   NO    Medications:  Current Outpatient Medications   Medication Sig Dispense Refill     albuterol (PROAIR HFA/PROVENTIL HFA/VENTOLIN HFA) 108 (90 Base) MCG/ACT inhaler Inhale 2 puffs into the lungs every 6 hours as needed for shortness of breath / dyspnea or wheezing 1 Inhaler 3     albuterol (PROAIR HFA/PROVENTIL HFA/VENTOLIN HFA) 108 (90 Base) MCG/ACT inhaler Inhale 2 puffs into the lungs every 6 hours as needed for shortness of breath / dyspnea or wheezing 1 Inhaler 11     dextromethorphan-guaiFENesin (MUCINEX DM)  MG per 12 hr tablet Take 1 tablet by mouth as needed        fluticasone-salmeterol (ADVAIR) 100-50 MCG/DOSE inhaler Inhale 1 puff into the lungs 2 times daily 3 Inhaler 3     gabapentin (NEURONTIN) 300 MG capsule Take 1 capsule (300 mg) by mouth nightly as needed (back spasm) 30 capsule 1     ibuprofen (ADVIL,MOTRIN) 600 MG tablet Take 600 mg by mouth every 6 hours as needed.       naproxen (NAPROSYN) 500 MG tablet Take 1 tablet (500 mg) by mouth 2 times daily (with meals) 28 tablet 0     sildenafil (VIAGRA) 100 MG tablet Take 1 tablet (100 mg) by mouth daily as needed (for erectile dysfunction) 15 tablet 8       Family History:  No family hx of lung cancer   No family hx of COPD or asthma    Review of Systems:  Complete 10 point ROS negative unless mentioned in HPI    Physical Exam:  /86    Pulse 69   SpO2 97%   General:  Adult male;appears stated age; no acute distress; the patient is a good historian  HEENT:  NCAT; EOMI; No icterus; no injection; MMM  Neck: Supple, full range of motion, no lymphadenopathy  Pulm: Decreased breathsounds throughout  CV: RRR, no murmurs, rubs or gallops  Abdomen: Soft, NT, ND, + BS  Extremities:  No cyanosis or clubbing, no edema  Neuro: Alert and oriented x 3, moves all extremities no obvious weakness  Skin: No skin rashes noted    Labs and Radiology:  CT chest (10/12/20): Personally reviewed by me and shows stable previously noted pulmonary nodules (officical radiology read is pending).     PFT's:    Previous PFTs personally reviewed again. Severe obstruction with mild reduction in DLCO.    Assessment and Plan:  Blayne Gonzalez is a 64-year-old male with history of COPD and multiple pulmonary nodules was seen for follow-up of COPD.      COPD: Severe obstruction on pulmonary function tests with significant bronchodilator response noted on previous test.  He continues to be quite stable from a respiratory standpoint.  Did have one exacerbation roughly 10 months ago but no further incidents since then.  He is maintained on Advair and uses albuterol as needed.  He denies any issues with dyspnea.     -Continue Advair and albuterol as needed  -Home action plan as needed (prednisone 40 mg x 5 days and doxycycline 100 mg twice a day x 7 days)    Pulmonary nodules: multiple noted on lung cancer CT chest in Oct 2019. JUAN C partially calcified nodule has increased in size. Subsequent PET CT showed low FDG uptake. Seen by interventional pulmonary who thought these were low risk for cancer. Repeat CT chest today shows what appears to be stable pulmonary nodules.     -Await official radiology report  -If nodules stable, will repeat CT in 1 year    Vaccinations: up to date on Pneumovax. Flu shot administered today.    Return to clinic in 1 year.    Raymond Brannon MD  Assistant  Professor of Medicine   Division of Pulmonary, Allergy, Critical Care and Sleep Medicine  [facility]  Pager: [phone]    The above note was dictated using voice recognition software and may include typographical errors. Please contact the author for any clarifications.        Again, thank you for allowing me to participate in the care of your patient.        Sincerely,        Raymond Brannon MD

## 2020-10-13 DIAGNOSIS — R91.1 PULMONARY NODULE: Primary | ICD-10-CM

## 2020-10-15 ENCOUNTER — TELEPHONE (OUTPATIENT)
Dept: PULMONOLOGY | Facility: CLINIC | Age: 65
End: 2020-10-15

## 2020-10-15 NOTE — TELEPHONE ENCOUNTER
Contacted by [facility] re CT chest CT 10/12 incidental finding:    Coronary artery calcium: Three-vessel coronary artery calcifications  including short segment stenosis severe calcification in the left  anterior descending coronary artery.    Message routed to MD.

## 2020-10-16 ENCOUNTER — TELEPHONE (OUTPATIENT)
Dept: PULMONOLOGY | Facility: CLINIC | Age: 65
End: 2020-10-16

## 2020-10-16 NOTE — TELEPHONE ENCOUNTER
----- Message from Poncho Ram sent at 10/16/2020  1:08 PM CDT -----    ----- Message -----  From: Marilee Ramirez APRN CNS  Sent: 10/16/2020  11:47 AM CDT  To: SHARRI Swartz, #    Hey,    He needs a phone visit with Dr. Zeb jimenez.    Thanks  H

## 2021-05-07 ENCOUNTER — IMMUNIZATION (OUTPATIENT)
Dept: NURSING | Facility: CLINIC | Age: 66
End: 2021-05-07
Payer: COMMERCIAL

## 2021-05-07 PROCEDURE — 91300 PR COVID VAC PFIZER DIL RECON 30 MCG/0.3 ML IM: CPT

## 2021-05-07 PROCEDURE — 0001A PR COVID VAC PFIZER DIL RECON 30 MCG/0.3 ML IM: CPT

## 2021-05-28 ENCOUNTER — IMMUNIZATION (OUTPATIENT)
Dept: NURSING | Facility: CLINIC | Age: 66
End: 2021-05-28
Attending: INTERNAL MEDICINE
Payer: COMMERCIAL

## 2021-05-28 PROCEDURE — 0002A PR COVID VAC PFIZER DIL RECON 30 MCG/0.3 ML IM: CPT

## 2021-05-28 PROCEDURE — 91300 PR COVID VAC PFIZER DIL RECON 30 MCG/0.3 ML IM: CPT

## 2021-06-01 ENCOUNTER — DOCUMENTATION ONLY (OUTPATIENT)
Dept: ADMINISTRATIVE | Facility: CLINIC | Age: 66
End: 2021-06-01

## 2021-06-11 DIAGNOSIS — J42 CHRONIC BRONCHITIS, UNSPECIFIED CHRONIC BRONCHITIS TYPE (H): ICD-10-CM

## 2021-06-11 DIAGNOSIS — J44.1 COPD EXACERBATION (H): ICD-10-CM

## 2021-06-11 RX ORDER — ALBUTEROL SULFATE 90 UG/1
2 AEROSOL, METERED RESPIRATORY (INHALATION) EVERY 6 HOURS PRN
Qty: 18 G | Refills: 4 | OUTPATIENT
Start: 2021-06-11

## 2021-07-25 ENCOUNTER — OFFICE VISIT (OUTPATIENT)
Dept: URGENT CARE | Facility: URGENT CARE | Age: 66
End: 2021-07-25
Payer: COMMERCIAL

## 2021-07-25 ENCOUNTER — NURSE TRIAGE (OUTPATIENT)
Dept: NURSING | Facility: CLINIC | Age: 66
End: 2021-07-25

## 2021-07-25 VITALS
OXYGEN SATURATION: 97 % | TEMPERATURE: 99.2 F | BODY MASS INDEX: 19.7 KG/M2 | DIASTOLIC BLOOD PRESSURE: 86 MMHG | SYSTOLIC BLOOD PRESSURE: 134 MMHG | HEIGHT: 68 IN | HEART RATE: 90 BPM | WEIGHT: 130 LBS

## 2021-07-25 DIAGNOSIS — R50.9 FEELS FEVERISH: ICD-10-CM

## 2021-07-25 DIAGNOSIS — Z20.822 PERSON UNDER INVESTIGATION FOR COVID-19: Primary | ICD-10-CM

## 2021-07-25 DIAGNOSIS — R52 GENERALIZED BODY ACHES: ICD-10-CM

## 2021-07-25 PROCEDURE — U0003 INFECTIOUS AGENT DETECTION BY NUCLEIC ACID (DNA OR RNA); SEVERE ACUTE RESPIRATORY SYNDROME CORONAVIRUS 2 (SARS-COV-2) (CORONAVIRUS DISEASE [COVID-19]), AMPLIFIED PROBE TECHNIQUE, MAKING USE OF HIGH THROUGHPUT TECHNOLOGIES AS DESCRIBED BY CMS-2020-01-R: HCPCS | Performed by: PHYSICIAN ASSISTANT

## 2021-07-25 PROCEDURE — 99214 OFFICE O/P EST MOD 30 MIN: CPT | Performed by: PHYSICIAN ASSISTANT

## 2021-07-25 PROCEDURE — U0005 INFEC AGEN DETEC AMPLI PROBE: HCPCS | Performed by: PHYSICIAN ASSISTANT

## 2021-07-25 ASSESSMENT — MIFFLIN-ST. JEOR: SCORE: 1359.18

## 2021-07-25 NOTE — PATIENT INSTRUCTIONS
Follow up immediately with severe headache, chest pain, or shortness of breath    Rest, isolate, hydrate, follow up if worsening or new symptoms      Patient Education     Coronavirus Disease 2019 (COVID-19): Caring for Yourself or Others   If you or a household member have symptoms of COVID-19, follow the guidelines below. This will help you manage symptoms and keep the virus from spreading.  If you have symptoms of COVID-19    Stay home and contact your care team. They will tell you what to do.    Don t panic. Keep in mind that other illnesses can cause similar symptoms.    Stay away from work, school, and public places.    Limit physical contact with others in your home. Limit visitors. No kissing.  Clean surfaces you touch with disinfectant.  If you need to cough or sneeze, do it into a tissue. Then throw the tissue into the trash. If you don't have tissues, cough or sneeze into the bend of your elbow.  Don t share food or personal items with people in your household. This includes items like eating and drinking utensils, towels, and bedding.  Wear a cloth face mask around other people. During a public health emergency, medical face masks may be reserved for healthcare workers. You may need to make a cloth face mask of your own. You can do this using a bandana, T-shirt, or other cloth. The CDC has instructions on how to make a face mask. Wear the mask so that it covers both your nose and mouth.  If you need to go to a hospital or clinic, call ahead to let them know. Expect the care team to wear masks, gowns, gloves, and eye protection. You may need to come to a different entrance or wait in a separate room.  Follow all instructions from your care team.    If you ve been diagnosed with COVID-19    Stay home and away from others, including other people in your home. (This is called self-isolation.) Don t leave home unless you need to get medical care. Don t go to work, school, or public places. Don t use buses,  taxis, or other public transportation.    Follow all instructions from your care team.    If you need to go to a hospital or clinic, call ahead to let them know. Expect the care team to wear masks, gowns, gloves, and eye protection. You may need to come to a different entrance or wait in a separate room.    Wear a face mask over your nose and mouth. This is to protect others from your germs. If you can t wear a mask, your caregivers should wear one. You may need to make your own mask using a bandana, T-shirt, or other cloth. See the CDC s instructions on how to make a face mask.    Have no contact with pets and other animals.    Don t share food or personal items with people in your household. This includes items like eating and drinking utensils, towels, and bedding.    If you need to cough or sneeze, do it into a tissue. Then throw the tissue into the trash. If you don't have tissues, cough or sneeze into the bend of your elbow.    Wash your hands often.    Self-care at home   At this time, there is no medicine approved to prevent or treat COVID-19. The FDA has approved an antiviral medicine (called remdesivir) for people being treated in the hospital. This is for people 12 years and older who weigh more than about 88 pounds (40 kgs). In certain cases, it may also be used for people younger than 12 years or who weigh less than about 88 pounds (40 kgs)..  Currently, treatment is mostly aimed at helping your body while it fights the virus.    Getting extra rest.    Drink extra fluids 6 to 8 glasses of liquids each day), unless a doctor has told you not to. Ask your care team which fluids are best for you. Avoid fluids that contain caffeine or alcohol.    Taking over-the-counter (OTC) medicine to reduce pain and fever. Your care team will tell you which medicine to use.  If you ve been in the hospital for COVID-19, follow your care team s instructions. They will tell you when to stop self-isolation. They may also  have you change positions to help your breathing (such as lying on your belly).  If a test showed that you have COVID-19, you may be asked to donate plasma after you ve recovered. (This is called COVID-19 convalescent plasma donation.) The plasma may contain antibodies to help fight the virus in others. Experts don't know if the plasma will work well as a treatment. Research continues, and the FDA has approved it for emergency use in certain people with serious or life-threatening COVID-19. For more information, talk to your care team.  Caring for a sick person     Follow all instructions from the care team.    Wash your hands often.    Wear protective clothing as advised.    Make sure the sick person wears a mask. If they can't wear a mask, don t stay in the same room with them. If you must be in the same room, wear a face mask. Make sure the mask covers both the nose and mouth.    Keep track of the sick person s symptoms.    Clean surfaces often with disinfectant. This includes phones, kitchen counters, fridge door handles, bathroom surfaces, and others.    Don t let anyone share household items with the sick person. This includes eating and drinking tools, towels, sheets, or blankets.    Clean fabrics and laundry well.    Keep other people and pets away from the sick person.    When you can stop self-isolation  When you are sick with COVID-19, you should stay away from other people. This is called self-isolation. The rules for ending self-isolation depend on your health in general.  If you are normally healthy:  You can stop self-isolation when all 3 of these are true:    You ve had no fever--and no medicine that reduces fever--for at least 24 hours. And     Your symptoms are better, such as cough or trouble breathing. And     At least 10 days have passed since your symptoms first started.  Talk with your care team before you leave home. They may tell you it s okay to leave, or they may give you different advice.  You do not need to be re-tested.  If you have a weak immune system, or you ve had severe COVID-19:  Follow your care team s instructions. You may be asked to self-isolate for 10 days to 20 days after your symptoms first started. Your care team may want to re-test you for COVID-19.  Note: If you re being treated for cancer, have an immune disorder (such as HIV), or have had a transplant (organ or bone marrow), you may have a weak immune system.  If you've already had COVID-19 once:  If you had the virus over 3 months ago, and you ve been exposed again, treat it like you've never had COVID-19. Stay home, limit your contact with others, call your care team, and watch for symptoms.  If it s been less than 3 months since you had the virus, you re symptom-free, and you ve been exposed again: You don t need to self-isolate or be re-tested. But if you develop new symptoms that can t be linked to another illness, please self-isolate and contact your care team.  When you return to public settings  When you are well enough to go outside your home, follow the CDC s guidance on cloth face masks.    Anyone over age 2 should wear a face mask in public, especially when it's hard to socially distance. This includes public transit, public protests and marches, and crowded stores, bars, and restaurants.    Face masks are most likely to reduce the spread of COVID-19 when they are widely used by people who are out in the public.  Certain people should not wear a face covering. These include:    Children under 2 years old    Anyone with a health, developmental, or mental health condition that can be made worse by wearing a mask    Anyone who is unconscious or unable to remove the face covering without help. See the CDC's guidance on who should not wear a face mask.  When to call your care team  Call your care team right away if a sick person has any of these:    Trouble breathing    Pain or pressure in chest  If a sick person has any of  "these, call 911:    Trouble breathing that gets worse    Pain or pressure in chest that gets worse    Blue tint to lips or face    Fast or irregular heartbeat    Confusion or trouble waking    Fainting or loss of consciousness    Coughing up blood  Going home from the hospital   If you have COVID-19 and were recently in the hospital:    Follow the instructions above for self-care and isolation.    Follow the hospital care team s instructions.    Ask questions if anything is unclear to you. Write down answers so you remember them.  Date last modified: 11/23/2020  [employer] last reviewed this educational content on 4/1/2020  This information has been modified by your health care provider with permission from the publisher.    4077-1751 [employer]. [address] [place] [address], [place] [address]. All rights reserved. This information is not intended as a substitute for professional medical care. Always follow your healthcare professional's instructions.           Patient Education     Viral Syndrome (Adult)  A viral illness may cause a number of symptoms such as fever. Other symptoms depend on the part of the body that the virus affects. If it settles in your nose, throat, and lungs, it may cause cough, sore throat, congestion, runny nose, headache, earache and other ear symptoms, or shortness of breath. If it settles in your stomach and intestinal tract, it may cause nausea, vomiting, cramping, and diarrhea. Sometimes it causes generalized symptoms like \"aching all over,\" feeling tired, loss of energy, or loss of appetite.  A viral illness usually lasts anywhere from several days to several weeks, but sometimes it lasts longer. In some cases, a more serious infection can look like a viral syndrome in the first few days of the illness. You may need another exam and additional tests to know the difference. Watch for the warning signs listed below for when to seek medical advice.  Home care  Follow these " guidelines for taking care of yourself at home:    If symptoms are severe, rest at home for the first 2 to 3 days.    Stay away from cigarette smoke - both your smoke and the smoke from others.    You may use over-the-counter acetaminophen or ibuprofen for fever, muscle aching, and headache, unless another medicine was prescribed for this. If you have chronic liver or kidney disease or ever had a stomach ulcer or gastrointestinal bleeding, talk with your healthcare provider before using these medicines. No one who is younger than 18 and ill with a fever should take aspirin. It may cause severe disease or death.    Your appetite may be poor, so a light diet is fine. Avoid dehydration by drinking 8 to 12, 8-ounce glasses of fluids each day. This may include water; orange juice; lemonade; apple, grape, and cranberry juice; clear fruit drinks; electrolyte replacement and sports drinks; and decaffeinated teas and coffee. If you have been diagnosed with a kidney disease, ask your healthcare provider how much and what types of fluids you should drink to prevent dehydration. If you have kidney disease, drinking too much fluid can cause it build up in the your body and be dangerous to your health.    Over-the-counter remedies won't shorten the length of the illness but may be helpful for symptoms such as cough, sore throat, nasal and sinus congestion, or diarrhea. Don't use decongestants if you have high blood pressure.  Follow-up care  Follow up with your healthcare provider if you do not improve over the next week.  Call 911  Call 911 if any of the following occur:    Convulsion    Feeling weak, dizzy, or like you are going to faint    Chest pain, or more than mild shortness of breath  When to seek medical advice  Call your healthcare provider right away if any of these occur:    Cough with lots of colored sputum (mucus) or blood in your sputum    Chest pain, shortness of breath, wheezing, or trouble breathing    Severe  headache; face, neck, or ear pain    Severe, constant pain in the lower right side of your belly (abdominal)    Continued vomiting (can t keep liquids down)    Frequent diarrhea (more than 5 times a day); blood (red or black color) or mucus in diarrhea    Feeling weak, dizzy, or like you are going to faint    Extreme thirst    Fever of 100.4 F (38 C) or higher, or as directed by your healthcare provider  Edmond last reviewed this educational content on 4/1/2018 2000-2021 [employer]. All rights reserved. This information is not intended as a substitute for professional medical care. Always follow your healthcare professional's instructions.

## 2021-07-25 NOTE — TELEPHONE ENCOUNTER
Triage Call: Patient has a runny nose, cough, headache, fever 100.0, all over body aches. Hx of COPD so mild sob. Patient wants to be tested for covid in urgent care. Per protocol guidelines patient was advised ED but patient prefers to go to urgent care. Patient was informed that if the urgent care feels it is more serious they will advised ED. Patient stated understanding.    COVID 19 Nurse Triage Plan/Patient Instructions    Please be aware that novel coronavirus (COVID-19) may be circulating in the community. If you develop symptoms such as fever, cough, or SOB or if you have concerns about the presence of another infection including coronavirus (COVID-19), please contact your health care provider or visit [email or web address].     Disposition/Instructions    ED Visit recommended. Follow protocol based instructions.     Bring Your Own Device:  Please also bring your smart device(s) (smart phones, tablets, laptops) and their charging cables for your personal use and to communicate with your care team during your visit.    Thank you for taking steps to prevent the spread of this virus.  o Limit your contact with others.  o Wear a simple mask to cover your cough.  o Wash your hands well and often.    Resources    [facility]: About COVID-19: [email or web address]    CDC: What to Do If You're Sick: www.cdc.gov/coronavirus/2019-ncov/about/steps-when-sick.html    CDC: Ending Home Isolation: www.cdc.gov/coronavirus/2019-ncov/hcp/disposition-in-home-patients.html     CDC: Caring for Someone: www.cdc.gov/coronavirus/2019-ncov/if-you-are-sick/care-for-someone.html     [facility]: Interim Guidance for Hospital Discharge to Home: www.health.[facility].[place]/diseases/coronavirus/hcp/hospdischarge.pdf    [facility] clinical trials (COVID-19 research studies): [email or web address]/umn-[email or web address]-[email or web address]     Below are the COVID-19 hotlines at the [place] Department of [facility] ([facility]). Interpreters are  available.   o For health questions: Call 427-355-4699 or 1-103.593.8671 (7 a.m. to 7 p.m.)  o For questions about schools and childcare: Call 263-076-9450 or 1-215.334.3856 (7 a.m. to 7 p.m.)     Shima Lizarraga RN Nursing Advisor 7/25/2021 3:29 PM     Reason for Disposition    MILD difficulty breathing (e.g., minimal/no SOB at rest, SOB with walking, pulse <100)    Additional Information    Negative: SEVERE difficulty breathing (e.g., struggling for each breath, speaks in single words)    Negative: Difficult to awaken or acting confused (e.g., disoriented, slurred speech)    Negative: Bluish (or gray) lips or face now    Negative: Shock suspected (e.g., cold/pale/clammy skin, too weak to stand, low BP, rapid pulse)    Negative: Sounds like a life-threatening emergency to the triager    Negative: [1] COVID-19 exposure AND [2] has not completed COVID-19 vaccine series AND [3] no symptoms    Negative: [1] COVID-19 exposure AND [2] completed COVID-19 vaccine series (fully vaccinated) AND [3] no symptoms    Negative: COVID-19 vaccine reaction suspected (e.g., fever, headache, muscle aches) occurring during days 1-3 after getting vaccine    Negative: COVID-19 vaccine, questions about    Negative: [1] COVID-19 vaccine series completed (fully vaccinated) in past 3 months AND [2] new-onset of COVID-19 symptoms BUT [3] no known exposure    Negative: [1] Had lab test confirmed COVID-19 infection within last 3 monthsAND [2] new-onset of COVID-19 symptoms BUT [3] no known exposure    Negative: [1] Lives with someone known to have influenza (flu test positive) AND [2] flu-like symptoms (e.g., cough, runny nose, sore throat, SOB; with or without fever)    Negative: [1] Adult with possible COVID-19 symptoms AND [2] triager concerned about severity of symptoms or other causes    Negative: COVID-19 and breastfeeding, questions about    Negative: SEVERE or constant chest pain or pressure (Exception: mild central chest pain, present  only when coughing)    Negative: MODERATE difficulty breathing (e.g., speaks in phrases, SOB even at rest, pulse 100-120)    Negative: [1] Headache AND [2] stiff neck (can't touch chin to chest)    Protocols used: CORONAVIRUS (COVID-19) DIAGNOSED OR [id number] 3.25

## 2021-07-25 NOTE — PROGRESS NOTES
SUBJECTIVE:  Blayne Gonzalez is a 65 year old male who presents to the clinic today with a chief complaint of headache, body aches, feverish for 2 days.  All mild.  Spent the day in bed yesterday.     Past Medical History:   Diagnosis Date     COPD (chronic obstructive pulmonary disease) (H)      Pulmonary nodule        Current Outpatient Medications   Medication Sig Dispense Refill     albuterol (PROAIR HFA/PROVENTIL HFA/VENTOLIN HFA) 108 (90 Base) MCG/ACT inhaler Inhale 2 puffs into the lungs every 6 hours as needed for shortness of breath / dyspnea or wheezing 1 Inhaler 11     fluticasone-salmeterol (ADVAIR) 100-50 MCG/DOSE inhaler Inhale 1 puff into the lungs 2 times daily 3 Inhaler 3     Ibuprofen-diphenhydrAMINE Cit (EQ IBUPROFEN PM PO)        albuterol (PROAIR HFA/PROVENTIL HFA/VENTOLIN HFA) 108 (90 Base) MCG/ACT inhaler Inhale 2 puffs into the lungs every 6 hours as needed for shortness of breath / dyspnea or wheezing 1 Inhaler 3     dextromethorphan-guaiFENesin (MUCINEX DM)  MG per 12 hr tablet Take 1 tablet by mouth as needed  (Patient not taking: Reported on 2021)       gabapentin (NEURONTIN) 300 MG capsule Take 1 capsule (300 mg) by mouth nightly as needed (back spasm) (Patient not taking: Reported on 2021) 30 capsule 1     ibuprofen (ADVIL,MOTRIN) 600 MG tablet Take 600 mg by mouth every 6 hours as needed. (Patient not taking: Reported on 2021)       naproxen (NAPROSYN) 500 MG tablet Take 1 tablet (500 mg) by mouth 2 times daily (with meals) (Patient not taking: Reported on 2021) 28 tablet 0     sildenafil (VIAGRA) 100 MG tablet Take 1 tablet (100 mg) by mouth daily as needed (for erectile dysfunction) (Patient not taking: Reported on 2021) 15 tablet 8       Social History     Tobacco Use     Smoking status: Former Smoker     Packs/day: 0.80     Years: 38.00     Pack years: 30.40     Types: Cigarettes     Quit date: 10/5/2015     Years since quittin.8     Smokeless  "tobacco: Never Used   Substance Use Topics     Alcohol use: Yes     Alcohol/week: 0.0 standard drinks     Comment: one beer every few months       ROS  Review of systems negative except as stated above.    OBJECTIVE:  /86   Pulse 90   Temp 99.2  F (37.3  C) (Tympanic)   Ht 1.727 m (5' 8\")   Wt 59 kg (130 lb)   SpO2 97%   BMI 19.77 kg/m    GENERAL APPEARANCE: healthy, alert and no distress  EYES:  conjunctiva clear  RESP: lungs clear to auscultation - no rales, rhonchi or wheezes  CV: regular rates and rhythm, normal S1 S2, no murmur noted  NEURO: Normal strength and tone, sensory exam grossly normal,  normal speech and mentation  SKIN: no suspicious lesions or rashes      ASSESSMENT:  (Z20.822) Person under investigation for COVID-19  (primary encounter diagnosis)  (R50.9) Feels feverish  (R52) Generalized body aches  Comment: no evidence of pneumonia or COPD exacerbation  Plan: Symptomatic COVID-19 Virus (Coronavirus) by PCR        Nasopharyngeal      Covid-19  Pt was evaluated during a global COVID-19 pandemic, which necessitated consideration that the patient might be at risk for infection with the SARS-CoV-2 virus that causes COVID-19.   Applicable protocols for evaluation were followed during the patient's care.   COVID-19 was considered as part of the patient's evaluation. The plan for testing is:  a test was ordered during this visit.    No severe headache, chest pain, shortness of breath  No additional infectious symptoms  Rest, isolate for 10 days, hydrate, test, follow up if worsening or new symptoms  HH members to isolate until test results, if positive isolate for 2 weeks and follow up for testing if symptoms occur  Red flags and emergent follow up discussed, and understood by patient  Follow up with PCP if symptoms worsen or fail to improve    Surgical mask, gown, shield, hairnet, gloves worn by provider      Patient Instructions   Follow up immediately with severe headache, chest pain, or " shortness of breath    Rest, isolate, hydrate, follow up if worsening or new symptoms      Patient Education     Coronavirus Disease 2019 (COVID-19): Caring for Yourself or Others   If you or a household member have symptoms of COVID-19, follow the guidelines below. This will help you manage symptoms and keep the virus from spreading.  If you have symptoms of COVID-19    Stay home and contact your care team. They will tell you what to do.    Don t panic. Keep in mind that other illnesses can cause similar symptoms.    Stay away from work, school, and public places.    Limit physical contact with others in your home. Limit visitors. No kissing.  Clean surfaces you touch with disinfectant.  If you need to cough or sneeze, do it into a tissue. Then throw the tissue into the trash. If you don't have tissues, cough or sneeze into the bend of your elbow.  Don t share food or personal items with people in your household. This includes items like eating and drinking utensils, towels, and bedding.  Wear a cloth face mask around other people. During a public health emergency, medical face masks may be reserved for healthcare workers. You may need to make a cloth face mask of your own. You can do this using a bandana, T-shirt, or other cloth. The CDC has instructions on how to make a face mask. Wear the mask so that it covers both your nose and mouth.  If you need to go to a hospital or clinic, call ahead to let them know. Expect the care team to wear masks, gowns, gloves, and eye protection. You may need to come to a different entrance or wait in a separate room.  Follow all instructions from your care team.    If you ve been diagnosed with COVID-19    Stay home and away from others, including other people in your home. (This is called self-isolation.) Don t leave home unless you need to get medical care. Don t go to work, school, or public places. Don t use buses, taxis, or other public transportation.    Follow all  instructions from your care team.    If you need to go to a hospital or clinic, call ahead to let them know. Expect the care team to wear masks, gowns, gloves, and eye protection. You may need to come to a different entrance or wait in a separate room.    Wear a face mask over your nose and mouth. This is to protect others from your germs. If you can t wear a mask, your caregivers should wear one. You may need to make your own mask using a bandana, T-shirt, or other cloth. See the CDC s instructions on how to make a face mask.    Have no contact with pets and other animals.    Don t share food or personal items with people in your household. This includes items like eating and drinking utensils, towels, and bedding.    If you need to cough or sneeze, do it into a tissue. Then throw the tissue into the trash. If you don't have tissues, cough or sneeze into the bend of your elbow.    Wash your hands often.    Self-care at home   At this time, there is no medicine approved to prevent or treat COVID-19. The FDA has approved an antiviral medicine (called remdesivir) for people being treated in the hospital. This is for people 12 years and older who weigh more than about 88 pounds (40 kgs). In certain cases, it may also be used for people younger than 12 years or who weigh less than about 88 pounds (40 kgs)..  Currently, treatment is mostly aimed at helping your body while it fights the virus.    Getting extra rest.    Drink extra fluids 6 to 8 glasses of liquids each day), unless a doctor has told you not to. Ask your care team which fluids are best for you. Avoid fluids that contain caffeine or alcohol.    Taking over-the-counter (OTC) medicine to reduce pain and fever. Your care team will tell you which medicine to use.  If you ve been in the hospital for COVID-19, follow your care team s instructions. They will tell you when to stop self-isolation. They may also have you change positions to help your breathing (such  as lying on your belly).  If a test showed that you have COVID-19, you may be asked to donate plasma after you ve recovered. (This is called COVID-19 convalescent plasma donation.) The plasma may contain antibodies to help fight the virus in others. Experts don't know if the plasma will work well as a treatment. Research continues, and the FDA has approved it for emergency use in certain people with serious or life-threatening COVID-19. For more information, talk to your care team.  Caring for a sick person     Follow all instructions from the care team.    Wash your hands often.    Wear protective clothing as advised.    Make sure the sick person wears a mask. If they can't wear a mask, don t stay in the same room with them. If you must be in the same room, wear a face mask. Make sure the mask covers both the nose and mouth.    Keep track of the sick person s symptoms.    Clean surfaces often with disinfectant. This includes phones, kitchen counters, fridge door handles, bathroom surfaces, and others.    Don t let anyone share household items with the sick person. This includes eating and drinking tools, towels, sheets, or blankets.    Clean fabrics and laundry well.    Keep other people and pets away from the sick person.    When you can stop self-isolation  When you are sick with COVID-19, you should stay away from other people. This is called self-isolation. The rules for ending self-isolation depend on your health in general.  If you are normally healthy:  You can stop self-isolation when all 3 of these are true:    You ve had no fever--and no medicine that reduces fever--for at least 24 hours. And     Your symptoms are better, such as cough or trouble breathing. And     At least 10 days have passed since your symptoms first started.  Talk with your care team before you leave home. They may tell you it s okay to leave, or they may give you different advice. You do not need to be re-tested.  If you have a weak  immune system, or you ve had severe COVID-19:  Follow your care team s instructions. You may be asked to self-isolate for 10 days to 20 days after your symptoms first started. Your care team may want to re-test you for COVID-19.  Note: If you re being treated for cancer, have an immune disorder (such as HIV), or have had a transplant (organ or bone marrow), you may have a weak immune system.  If you've already had COVID-19 once:  If you had the virus over 3 months ago, and you ve been exposed again, treat it like you've never had COVID-19. Stay home, limit your contact with others, call your care team, and watch for symptoms.  If it s been less than 3 months since you had the virus, you re symptom-free, and you ve been exposed again: You don t need to self-isolate or be re-tested. But if you develop new symptoms that can t be linked to another illness, please self-isolate and contact your care team.  When you return to public settings  When you are well enough to go outside your home, follow the CDC s guidance on cloth face masks.    Anyone over age 2 should wear a face mask in public, especially when it's hard to socially distance. This includes public transit, public protests and marches, and crowded stores, bars, and restaurants.    Face masks are most likely to reduce the spread of COVID-19 when they are widely used by people who are out in the public.  Certain people should not wear a face covering. These include:    Children under 2 years old    Anyone with a health, developmental, or mental health condition that can be made worse by wearing a mask    Anyone who is unconscious or unable to remove the face covering without help. See the CDC's guidance on who should not wear a face mask.  When to call your care team  Call your care team right away if a sick person has any of these:    Trouble breathing    Pain or pressure in chest  If a sick person has any of these, call 911:    Trouble breathing that gets  "worse    Pain or pressure in chest that gets worse    Blue tint to lips or face    Fast or irregular heartbeat    Confusion or trouble waking    Fainting or loss of consciousness    Coughing up blood  Going home from the hospital   If you have COVID-19 and were recently in the hospital:    Follow the instructions above for self-care and isolation.    Follow the hospital care team s instructions.    Ask questions if anything is unclear to you. Write down answers so you remember them.  Date last modified: 11/23/2020  [employer] last reviewed this educational content on 4/1/2020  This information has been modified by your health care provider with permission from the publisher.    9734-1569 [employer]. [address] [place] [address], [place] [address]. All rights reserved. This information is not intended as a substitute for professional medical care. Always follow your healthcare professional's instructions.           Patient Education     Viral Syndrome (Adult)  A viral illness may cause a number of symptoms such as fever. Other symptoms depend on the part of the body that the virus affects. If it settles in your nose, throat, and lungs, it may cause cough, sore throat, congestion, runny nose, headache, earache and other ear symptoms, or shortness of breath. If it settles in your stomach and intestinal tract, it may cause nausea, vomiting, cramping, and diarrhea. Sometimes it causes generalized symptoms like \"aching all over,\" feeling tired, loss of energy, or loss of appetite.  A viral illness usually lasts anywhere from several days to several weeks, but sometimes it lasts longer. In some cases, a more serious infection can look like a viral syndrome in the first few days of the illness. You may need another exam and additional tests to know the difference. Watch for the warning signs listed below for when to seek medical advice.  Home care  Follow these guidelines for taking care of yourself at home:    If " symptoms are severe, rest at home for the first 2 to 3 days.    Stay away from cigarette smoke - both your smoke and the smoke from others.    You may use over-the-counter acetaminophen or ibuprofen for fever, muscle aching, and headache, unless another medicine was prescribed for this. If you have chronic liver or kidney disease or ever had a stomach ulcer or gastrointestinal bleeding, talk with your healthcare provider before using these medicines. No one who is younger than 18 and ill with a fever should take aspirin. It may cause severe disease or death.    Your appetite may be poor, so a light diet is fine. Avoid dehydration by drinking 8 to 12, 8-ounce glasses of fluids each day. This may include water; orange juice; lemonade; apple, grape, and cranberry juice; clear fruit drinks; electrolyte replacement and sports drinks; and decaffeinated teas and coffee. If you have been diagnosed with a kidney disease, ask your healthcare provider how much and what types of fluids you should drink to prevent dehydration. If you have kidney disease, drinking too much fluid can cause it build up in the your body and be dangerous to your health.    Over-the-counter remedies won't shorten the length of the illness but may be helpful for symptoms such as cough, sore throat, nasal and sinus congestion, or diarrhea. Don't use decongestants if you have high blood pressure.  Follow-up care  Follow up with your healthcare provider if you do not improve over the next week.  Call 911  Call 911 if any of the following occur:    Convulsion    Feeling weak, dizzy, or like you are going to faint    Chest pain, or more than mild shortness of breath  When to seek medical advice  Call your healthcare provider right away if any of these occur:    Cough with lots of colored sputum (mucus) or blood in your sputum    Chest pain, shortness of breath, wheezing, or trouble breathing    Severe headache; face, neck, or ear pain    Severe, constant  pain in the lower right side of your belly (abdominal)    Continued vomiting (can t keep liquids down)    Frequent diarrhea (more than 5 times a day); blood (red or black color) or mucus in diarrhea    Feeling weak, dizzy, or like you are going to faint    Extreme thirst    Fever of 100.4 F (38 C) or higher, or as directed by your healthcare provider  [employer] last reviewed this educational content on 4/1/2018 2000-2021 [employer]. All rights reserved. This information is not intended as a substitute for professional medical care. Always follow your healthcare professional's instructions.

## 2021-07-26 ENCOUNTER — TELEPHONE (OUTPATIENT)
Dept: URGENT CARE | Facility: URGENT CARE | Age: 66
End: 2021-07-26

## 2021-07-26 NOTE — TELEPHONE ENCOUNTER
Coronavirus (COVID-19) Notification     Reason for call  Patient requesting results     Lab Result    Lab test 2019-nCoV rRt-PCR in process        RN Recommendations/Instructions per [facility]  Continue quarantee and following instructions until you receive the results     Please Contact your PCP clinic or return to the Emergency department if your:    Symptoms worsen or other concerning symptom's.     Patient informed that if test for COVID19 is POSITIVE,  you will receive a call typically within 48 hours from the test date (date lab collected).  If NEGATIVE result, you will receive a letter in the mail or [place].      Toya Brennan LPN

## 2021-07-27 ENCOUNTER — TELEPHONE (OUTPATIENT)
Dept: EMERGENCY MEDICINE | Facility: CLINIC | Age: 66
End: 2021-07-27

## 2021-07-27 ENCOUNTER — NURSE TRIAGE (OUTPATIENT)
Dept: NURSING | Facility: CLINIC | Age: 66
End: 2021-07-27

## 2021-07-27 ENCOUNTER — ANCILLARY PROCEDURE (OUTPATIENT)
Dept: GENERAL RADIOLOGY | Facility: CLINIC | Age: 66
End: 2021-07-27
Attending: PHYSICIAN ASSISTANT
Payer: COMMERCIAL

## 2021-07-27 ENCOUNTER — OFFICE VISIT (OUTPATIENT)
Dept: FAMILY MEDICINE | Facility: CLINIC | Age: 66
End: 2021-07-27
Payer: COMMERCIAL

## 2021-07-27 VITALS
DIASTOLIC BLOOD PRESSURE: 89 MMHG | WEIGHT: 121.4 LBS | RESPIRATION RATE: 22 BRPM | BODY MASS INDEX: 18.4 KG/M2 | HEIGHT: 68 IN | HEART RATE: 102 BPM | TEMPERATURE: 98.1 F | SYSTOLIC BLOOD PRESSURE: 137 MMHG | OXYGEN SATURATION: 97 %

## 2021-07-27 DIAGNOSIS — R06.02 SOB (SHORTNESS OF BREATH): ICD-10-CM

## 2021-07-27 DIAGNOSIS — J44.1 COPD EXACERBATION (H): ICD-10-CM

## 2021-07-27 DIAGNOSIS — R50.9 FEVER AND CHILLS: Primary | ICD-10-CM

## 2021-07-27 LAB — SARS-COV-2 RNA RESP QL NAA+PROBE: NEGATIVE

## 2021-07-27 PROCEDURE — 99214 OFFICE O/P EST MOD 30 MIN: CPT | Performed by: PHYSICIAN ASSISTANT

## 2021-07-27 PROCEDURE — 71046 X-RAY EXAM CHEST 2 VIEWS: CPT | Mod: FY | Performed by: RADIOLOGY

## 2021-07-27 RX ORDER — DOXYCYCLINE HYCLATE 50 MG/1
100 CAPSULE ORAL 2 TIMES DAILY
Qty: 28 CAPSULE | Refills: 0 | Status: CANCELLED | OUTPATIENT
Start: 2021-07-27

## 2021-07-27 RX ORDER — FLUTICASONE PROPIONATE 50 MCG
2 SPRAY, SUSPENSION (ML) NASAL DAILY
Qty: 18 ML | Refills: 3 | Status: SHIPPED | OUTPATIENT
Start: 2021-07-27

## 2021-07-27 RX ORDER — PREDNISONE 20 MG/1
40 TABLET ORAL DAILY
Qty: 10 TABLET | Refills: 0 | Status: CANCELLED | OUTPATIENT
Start: 2021-07-27 | End: 2021-08-03

## 2021-07-27 ASSESSMENT — MIFFLIN-ST. JEOR: SCORE: 1320.17

## 2021-07-27 NOTE — RESULT ENCOUNTER NOTE
"Please call patient with the following message:    Chest xray within normal limits, no signs of pneumonia.  Keep and eye on symptoms and can always contact us with any worsening/changes as we discussed in clinic.  Thanks  Madina \"Presley\" RUPERTO Smyth   "

## 2021-07-27 NOTE — TELEPHONE ENCOUNTER
"Coronavirus (COVID-19) Notification    Pt asking # directly linked to us so that he doesn't need to be transferred via clinic. Gave cb #, I did also mention to him as he was concerned \"nobody is running my test right now\" that sometimes it could take longer than usual depending on different factors like batch it could be in, etc. I also informed him that if he tests positive he will receive a call.      Reason for call  Patient requesting results     Lab Result    Lab test 2019-nCoV rRt-PCR in process        RN Recommendations/Instructions per [facility]  Continue quarantee and following instructions until you receive the results     Please Contact your PCP clinic or return to the Emergency department if your:    Symptoms worsen or other concerning symptom's.     Patient informed that if test for COVID19 is POSITIVE,  you will receive a call typically within 48 hours from the test date (date lab collected).  If NEGATIVE result, you will receive a letter in the mail or [employer].      Enedina Romero      "

## 2021-07-27 NOTE — PATIENT INSTRUCTIONS
Encouraged mucinex/guafenisin, warm salt water gargles, cepacol spray, soothers/lozenges, sinus rinses (neilmed), flonase (2 sprays per nostril daily x 2 weeks), vitamin c, fluids and rest.  May alternate tylenol and NSAIDS (ibuprofen, advil, aleve type products) every 4-6 hours for the next few days as needed.    Return to clinic with any worsening or changes in symptoms.       Did you know?      You can schedule a video visit for follow-up appointments as well as future appointments for certain conditions.  Please see the below link.     [email or web address]    If you have not already done so,  I encourage you to sign up for [employer] ([email or web address]).  This will allow you to review your results, securely communicate with a provider, and schedule virtual visits as well.

## 2021-07-27 NOTE — TELEPHONE ENCOUNTER
Patient calling with symptoms starting 7/22/21. Reporting no improvement.    Headache, body aches. History of COPD taking Advair as directed with improvement. Denies increased shortness of breath. Reports occasional cough.    Temp 100-101 Oral now. Patient stating he has not had any fever reducing medication.     Patient was seen in [facility] 7/25/21 negative COVID 19 testing.     Patient takes Advair in morning.    Carley Andersen RN  [facility]        COVID 19 Nurse Triage Plan/Patient Instructions    Please be aware that novel coronavirus (COVID-19) may be circulating in the community. If you develop symptoms such as fever, cough, or SOB or if you have concerns about the presence of another infection including coronavirus (COVID-19), please contact your health care provider or visit [email or web address].     Disposition/Instructions    In-Person Visit with provider recommended. Reference Visit Selection Guide.    Thank you for taking steps to prevent the spread of this virus.  o Limit your contact with others.  o Wear a simple mask to cover your cough.  o Wash your hands well and often.    Resources    [facility]: About COVID-19: [email or web address]    CDC: What to Do If You're Sick: www.cdc.gov/coronavirus/2019-ncov/about/steps-when-sick.html    CDC: Ending Home Isolation: www.cdc.gov/coronavirus/2019-ncov/hcp/disposition-in-home-patients.html     CDC: Caring for Someone: www.cdc.gov/coronavirus/2019-ncov/if-you-are-sick/care-for-someone.html     [facility]: Interim Guidance for Hospital Discharge to Home: www.health.[facility].mn.us/diseases/coronavirus/hcp/hospdischarge.pdf    [facility] clinical trials (COVID-19 research studies): [email or web address]/um-clinical-trials     Below are the COVID-19 hotlines at the [facility] ([facility]). Interpreters are available.   o For health questions: Call 986-568-1760 or 1-214.654.6809 (7 a.m. to 7  p.m.)  o For questions about schools and childcare: Call 304-232-5941 or 1-456.979.1223 (7 a.m. to 7 p.m.)                     Reason for Disposition    Fever > 100.0 F (37.8 C) and has diabetes mellitus or a weak immune system (e.g., HIV positive, cancer chemotherapy, organ transplant, splenectomy, chronic steroids)    Additional Information    Negative: Severe difficulty breathing (e.g., struggling for each breath, speaks in single words)    Negative: Very weak (can't stand)    Negative: Sounds like a life-threatening emergency to the triager    Negative: Patient sounds very sick or weak to the triager    Negative: Fever > 103 F (39.4 C)    Negative: Fever > 101 F (38.3 C) and over 60 years of age    Protocols used: COMMON COLD-A-[place]

## 2021-07-27 NOTE — TELEPHONE ENCOUNTER
Patient called into the Covid results line requesting Covid results.  Patient results were negative. Patient reports I am still having Sx of this cold and agrees to speak with Triage nurse. Patient was transferred to triage and has no other questions or concerns.   Berenice Sandoval LPN

## 2021-07-27 NOTE — PROGRESS NOTES
Assessment & Plan     Fever and chills  SOB (shortness of breath)  COPD exacerbation (H)  Unclear etiology but improving today - afebrile, COVID negative.  Chest xray updated today. Over the counter options and nasal spray sent to pharmacy.  Option for doxy and/or oral prednisone pending chest xray results or with any worsening/changes in symptoms, ok for patient to give update in a few days. Return to clinic with any worsening or changes in symptoms or go to ER Urgent care in off hours.  - fluticasone (FLONASE) 50 MCG/ACT nasal spray; Spray 2 sprays into both nostrils daily  - XR Chest 2 Views; Future      Review of prior external note(s) from -  note from 7/25/21  20 minutes spent on the date of the encounter doing chart review, history and exam, documentation and further activities per the note       Patient Instructions   Encouraged mucinex/guafenisin, warm salt water gargles, cepacol spray, soothers/lozenges, sinus rinses (neilmed), flonase (2 sprays per nostril daily x 2 weeks), vitamin c, fluids and rest.  May alternate tylenol and NSAIDS (ibuprofen, advil, aleve type products) every 4-6 hours for the next few days as needed.    Return to clinic with any worsening or changes in symptoms.       Did you know?      You can schedule a video visit for follow-up appointments as well as future appointments for certain conditions.  Please see the below link.     [email or web address]    If you have not already done so,  I encourage you to sign up for [facility] ([email or web address]).  This will allow you to review your results, securely communicate with a provider, and schedule virtual visits as well.      No follow-ups on file.    Madina Smyth PA-C  [facility]    Noy Cisneros is a 65 year old who presents for the following health issues     HPI     Acute Illness  Acute illness concerns: headache, body ache  Onset/Duration:  "7/22/21 - negative COVID 7/25/21  Symptoms:  Fever: YES  Chills/Sweats: YES  Headache (location?): YES  Sinus Pressure: YES  Conjunctivitis:  no  Ear Pain: no  Rhinorrhea: YES  Congestion: YES  Sore Throat: no  Cough: YES  Wheeze: no  Decreased Appetite: no  Nausea: no  Vomiting: no  Diarrhea: no  Dysuria/Freq.: no  Dysuria or Hematuria: no  Fatigue/Achiness: YES  Sick/Strep Exposure: none  Therapies tried and outcome: Advair daily; has not needed rescue inhaler lately.    Feels different than previous COPD exacerbations; hasn't needed prednisone and doxy in a while.  Less chest congestion/cough and more headache, fevers and body aches.  Temps ~ 100 this morning, sweating this morning.  Patient is COVID vaccinated.      Review of Systems   Constitutional, HEENT, cardiovascular, pulmonary, GI, , musculoskeletal, neuro, skin, endocrine and psych systems are negative, except as otherwise noted.      Objective    /89 (Patient Position: Sitting, Cuff Size: Adult Regular)   Pulse 102   Temp 98.1  F (36.7  C) (Tympanic)   Resp 22   Ht 1.727 m (5' 8\")   Wt 55.1 kg (121 lb 6.4 oz)   SpO2 97%   BMI 18.46 kg/m    Body mass index is 18.46 kg/m .  Physical Exam   GENERAL: healthy, alert and no distress  EYES: Eyes grossly normal to inspection, PERRL and conjunctivae and sclerae normal  HENT: ear canals and TM's normal, nose and mouth without ulcers or lesions  NECK: no adenopathy, no asymmetry, masses, or scars and thyroid normal to palpation  RESP: lungs clear to auscultation - no rales, rhonchi or wheezes  CV: regular rate and rhythm, normal S1 S2, no S3 or S4, no murmur, click or rub, no peripheral edema and peripheral pulses strong  MS: no gross musculoskeletal defects noted, no edema  PSYCH: mentation appears normal, affect normal/bright    CXR - results pending official radiologist reading.           "

## 2021-08-02 ENCOUNTER — TELEPHONE (OUTPATIENT)
Dept: FAMILY MEDICINE | Facility: CLINIC | Age: 66
End: 2021-08-02

## 2021-08-02 NOTE — TELEPHONE ENCOUNTER
"Left message for patient to call [facility] back  When patient calls back please transfer to RN  Eryn ESPINOZA RN        Please call patient with the following message:     Chest xray within normal limits, no signs of pneumonia.  Keep and eye on symptoms and can always contact us with any worsening/changes as we discussed in clinic.  Thanks  Madina \"Presley\" RUPERTO Smyth   "

## 2021-08-02 NOTE — LETTER
"August 9, 2021      Blayne Gonzalez  [address]   [place] [address]      Hi Blayne,     We tried to call you about results.     Chest xray within normal limits, no signs of pneumonia.    Keep and eye on symptoms and can always contact us with any worsening/changes as we discussed in clinic.    Thanks,  Madina \"Presley\" RUPERTO Smyth   "

## 2021-08-23 DIAGNOSIS — N52.9 ERECTILE DYSFUNCTION, UNSPECIFIED ERECTILE DYSFUNCTION TYPE: ICD-10-CM

## 2021-08-23 RX ORDER — SILDENAFIL 100 MG/1
100 TABLET, FILM COATED ORAL DAILY PRN
Qty: 15 TABLET | Refills: 8 | Status: SHIPPED | OUTPATIENT
Start: 2021-08-23 | End: 2022-06-07

## 2021-08-23 NOTE — TELEPHONE ENCOUNTER
[facility] Call Center    Phone Message    May a detailed message be left on voicemail: yes     Reason for Call: Medication Refill Request    Has the patient contacted the pharmacy for the refill? Yes   Name of medication being requested: sildenafil (VIAGRA) 100 MG tablet  Provider who prescribed the medication: Nohemi Centeno  Pharmacy: [facility] in [place]  Date medication is needed: as soon as possible     Pt is requesting a call when this has been done      Action Taken: Message routed to:  [facility] JULIO    Travel Screening: Not Applicable

## 2021-08-23 NOTE — TELEPHONE ENCOUNTER
sildenafil (VIAGRA) 100 MG tablet  Last Written Prescription Date:  8/6/2020  Last Fill Quantity: 15,   # refills: 8  Last Office Visit : 2/14/2020  Future Office visit:  None    Routing refill request to provider for review/approval because:  Second Request      Over due office visit  30 day pended      Clinic notified       Suzanne Spencer RN  Central Triage Red Flags/Med Refills

## 2021-08-25 ENCOUNTER — TELEPHONE (OUTPATIENT)
Dept: PULMONOLOGY | Facility: CLINIC | Age: 66
End: 2021-08-25

## 2021-08-25 NOTE — TELEPHONE ENCOUNTER
LVM with direct call back to discuss rescheduling appt with Dr. Brannon in October as he is no longer available. Informed him Dr. Brannon would be leaving the clinic, therefore pt would be set up with new doctor.

## 2021-08-27 ENCOUNTER — TELEPHONE (OUTPATIENT)
Dept: PULMONOLOGY | Facility: CLINIC | Age: 66
End: 2021-08-27

## 2021-08-27 NOTE — TELEPHONE ENCOUNTER
LVM with direct call back again to discuss rescheduling appt with Dr. brannon in October as he will no longer be available and would need a new appt with a different provider as Dr. Brannon will be leaving the clinic in Sept. Requested call back.

## 2021-08-31 ENCOUNTER — TELEPHONE (OUTPATIENT)
Dept: PULMONOLOGY | Facility: CLINIC | Age: 66
End: 2021-08-31

## 2021-08-31 NOTE — TELEPHONE ENCOUNTER
lvm with direct call back and call center number to reschedule appt with Dr. Brannon in October as he is no longer available. Informed pt that provider will be leaving clinic in Sept and would need a new doctor.. third attempt with no success. Appt will be cancelled and letter sent

## 2021-11-24 ENCOUNTER — ANCILLARY PROCEDURE (OUTPATIENT)
Dept: CT IMAGING | Facility: CLINIC | Age: 66
End: 2021-11-24
Attending: STUDENT IN AN ORGANIZED HEALTH CARE EDUCATION/TRAINING PROGRAM
Payer: COMMERCIAL

## 2021-11-24 ENCOUNTER — OFFICE VISIT (OUTPATIENT)
Dept: PULMONOLOGY | Facility: CLINIC | Age: 66
End: 2021-11-24
Attending: STUDENT IN AN ORGANIZED HEALTH CARE EDUCATION/TRAINING PROGRAM
Payer: COMMERCIAL

## 2021-11-24 VITALS
BODY MASS INDEX: 18.66 KG/M2 | HEIGHT: 69 IN | SYSTOLIC BLOOD PRESSURE: 134 MMHG | DIASTOLIC BLOOD PRESSURE: 88 MMHG | HEART RATE: 87 BPM | OXYGEN SATURATION: 97 % | WEIGHT: 126 LBS

## 2021-11-24 DIAGNOSIS — Z23 NEED FOR VACCINATION: ICD-10-CM

## 2021-11-24 DIAGNOSIS — R91.1 PULMONARY NODULE: ICD-10-CM

## 2021-11-24 DIAGNOSIS — R91.1 PULMONARY NODULE: Primary | ICD-10-CM

## 2021-11-24 PROCEDURE — 90732 PPSV23 VACC 2 YRS+ SUBQ/IM: CPT | Performed by: STUDENT IN AN ORGANIZED HEALTH CARE EDUCATION/TRAINING PROGRAM

## 2021-11-24 PROCEDURE — G0008 ADMIN INFLUENZA VIRUS VAC: HCPCS | Performed by: STUDENT IN AN ORGANIZED HEALTH CARE EDUCATION/TRAINING PROGRAM

## 2021-11-24 PROCEDURE — G0463 HOSPITAL OUTPT CLINIC VISIT: HCPCS

## 2021-11-24 PROCEDURE — 250N000011 HC RX IP 250 OP 636: Performed by: STUDENT IN AN ORGANIZED HEALTH CARE EDUCATION/TRAINING PROGRAM

## 2021-11-24 PROCEDURE — 90682 RIV4 VACC RECOMBINANT DNA IM: CPT | Performed by: STUDENT IN AN ORGANIZED HEALTH CARE EDUCATION/TRAINING PROGRAM

## 2021-11-24 PROCEDURE — 99213 OFFICE O/P EST LOW 20 MIN: CPT | Mod: GC | Performed by: STUDENT IN AN ORGANIZED HEALTH CARE EDUCATION/TRAINING PROGRAM

## 2021-11-24 PROCEDURE — 71250 CT THORAX DX C-: CPT | Performed by: RADIOLOGY

## 2021-11-24 PROCEDURE — G0009 ADMIN PNEUMOCOCCAL VACCINE: HCPCS | Performed by: STUDENT IN AN ORGANIZED HEALTH CARE EDUCATION/TRAINING PROGRAM

## 2021-11-24 RX ADMIN — PNEUMOCOCCAL VACCINE POLYVALENT 0.5 ML
25; 25; 25; 25; 25; 25; 25; 25; 25; 25; 25; 25; 25; 25; 25; 25; 25; 25; 25; 25; 25; 25; 25 INJECTION, SOLUTION INTRAMUSCULAR; SUBCUTANEOUS at 15:53

## 2021-11-24 RX ADMIN — INFLUENZA A VIRUS A/WISCONSIN/588/2019 (H1N1) RECOMBINANT HEMAGGLUTININ ANTIGEN, INFLUENZA A VIRUS A/TASMANIA/503/2020 (H3N2) RECOMBINANT HEMAGGLUTININ ANTIGEN, INFLUENZA B VIRUS B/WASHINGTON/02/2019 RECOMBINANT HEMAGGLUTININ ANTIGEN, AND INFLUENZA B VIRUS B/PHUKET/3073/2013 RECOMBINANT HEMAGGLUTININ ANTIGEN 0.5 ML: 45; 45; 45; 45 INJECTION INTRAMUSCULAR at 15:52

## 2021-11-24 ASSESSMENT — MIFFLIN-ST. JEOR: SCORE: 1351.91

## 2021-11-24 ASSESSMENT — PAIN SCALES - GENERAL: PAINLEVEL: NO PAIN (0)

## 2021-11-24 NOTE — LETTER
2021     RE: Blayne Gonzalez  [address]  [place] [address]    Dear Colleague,    Thank you for referring your patient, Blayne Gonzalez, to the [facility] [place]. Please see a copy of my visit note below.    Clinic Follow-Up Visit Note    HPI/Interval History: 63-year-old male with history of COPD and multiple pulmonary nodules was seen for follow-up of COPD.  He was seen by Dr. Brannon 1 year ago.  He has no change in symptoms.  He is able to perform all of his activities of daily living including manual work as a hernandez, going up and down stairs, walking long flat distances.  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  Majority of our discussion today focused on the risks and benefits of ongoing lung cancer screening.  He has had instances of extensive work-up as he has multiple pulmonary nodules regularly which has caused him an emotional and financial burden.  Full review of systems, 12 point, otherwise negative. Uses advair. Rarely uses albuterol. No recent need for steroids.    PMH:  Past Medical History:   Diagnosis Date     COPD (chronic obstructive pulmonary disease) (H)      Pulmonary nodule      Allergies:  No Known Allergies    Social History:  Social History     Socioeconomic History     Marital status: Single     Spouse name: Not on file     Number of children: Not on file     Years of education: Not on file     Highest education level: Not on file   Occupational History     Not on file   Tobacco Use     Smoking status: Former Smoker     Packs/day: 0.80     Years: 38.00     Pack years: 30.40     Types: Cigarettes     Quit date: 10/5/2015     Years since quittin.1     Smokeless tobacco: Never Used   Substance and Sexual Activity     Alcohol use: Yes     Alcohol/week: 0.0 standard drinks     Comment: one beer every few months     Drug use: Yes     Comment:  history  of marijuana use within the year     Sexual activity: Yes     Partners: Female   Other Topics Concern     Parent/sibling w/ CABG, MI or angioplasty before 65F 55M? Not Asked   Social History Narrative    The patient has a 40 pk yr tobacco hx.  He has no ongoing active use.  Alcohol use is 1 alcoholic drinks per week.  He does use of occasional marijuana gummies.          He is employed as a hernandez and builder.         The patient is single.  Has 0 children.        Sexually active with one female partners.      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       The patient is single.  Has no children.    Hot Tub Exposure: NO  Recent Travel:  NO   Hx of incarceration:  NO  Bird Exposure:   NO  Animal Exposure:  NO  Inhalation Exposure:  YES, sheet metal grinding dust  Hobbies:   NO    Medications:  Current Outpatient Medications   Medication Sig Dispense Refill     albuterol (PROAIR HFA/PROVENTIL HFA/VENTOLIN HFA) 108 (90 Base) MCG/ACT inhaler Inhale 2 puffs into the lungs every 6 hours as needed for shortness of breath / dyspnea or wheezing 1 Inhaler 3     albuterol (PROAIR HFA/PROVENTIL HFA/VENTOLIN HFA) 108 (90 Base) MCG/ACT inhaler Inhale 2 puffs into the lungs every 6 hours as needed for shortness of breath / dyspnea or wheezing 1 Inhaler 11     dextromethorphan-guaiFENesin (MUCINEX DM)  MG per 12 hr tablet Take 1 tablet by mouth as needed  (Patient not taking: Reported on 7/25/2021)    "    fluticasone (FLONASE) 50 MCG/ACT nasal spray Spray 2 sprays into both nostrils daily 18 mL 3     fluticasone-salmeterol (ADVAIR) 100-50 MCG/DOSE inhaler Inhale 1 puff into the lungs 2 times daily 3 Inhaler 3     gabapentin (NEURONTIN) 300 MG capsule Take 1 capsule (300 mg) by mouth nightly as needed (back spasm) (Patient not taking: Reported on 7/25/2021) 30 capsule 1     ibuprofen (ADVIL,MOTRIN) 600 MG tablet Take 600 mg by mouth every 6 hours as needed. (Patient not taking: Reported on 7/25/2021)       Ibuprofen-diphenhydrAMINE Cit (EQ IBUPROFEN PM PO)        naproxen (NAPROSYN) 500 MG tablet Take 1 tablet (500 mg) by mouth 2 times daily (with meals) (Patient not taking: Reported on 7/25/2021) 28 tablet 0     sildenafil (VIAGRA) 100 MG tablet Take 1 tablet (100 mg) by mouth daily as needed (for erectile dysfunction) 15 tablet 8       Family History:  No family hx of lung cancer   No family hx of COPD or asthma    Review of Systems:  Complete 10 point ROS negative unless mentioned in HPI    Physical Exam:  /88   Pulse 87   Ht 1.753 m (5' 9\")   Wt 57.2 kg (126 lb)   SpO2 97%   BMI 18.61 kg/m    General:  Adult male;appears stated age; no acute distress; the patient is a good historian  HEENT:  NCAT; EOMI; No icterus; no injection; MMM  Neck: Supple, full range of motion, no lymphadenopathy  Pulm: cta, non labored  CV: RRR, no murmurs, rubs or gallops  Abdomen: Soft, NT, ND, + BS  Extremities:  No cyanosis or clubbing, no edema  Neuro: Alert and oriented x 3, moves all extremities no obvious weakness  Skin: No skin rashes noted    Labs and Radiology:    CT Chest Nov 24  FINDINGS: No contrast. Included thyroid appears grossly unremarkable.  There is atherosclerosis in the aorta and major branch vessels from  the arch. Coronary artery disease. Heart size normal. Thoracic aorta  and main pulmonary artery normal in size. No pleural or pericardial  effusion. The included upper abdomen appears grossly " unremarkable. No  enlarged hilar, mediastinal or axillary lymph nodes.     Detailed review of the lungs shows calcification bilaterally. The  right upper lobe nodular opacity which previously measured 9 x 5 mm  and was a new finding on the prior examination is not evident on this  examination, this may have been inflammatory nodule. The other nodules  appear similar including the noncalcified 6 mm right lower lobe nodule  (series 4 image 208). Minimal degenerative changes are present in the  thoracic spine with no suspicious sclerotic or lytic/destructive bony  lesion.                                                       IMPRESSION: ACR Lung-RADS 2: Benign behavior. Recommend 12 month  follow-up chest CT. Mild atherosclerosis. Granulomatous disease.     PFT's: Previous PFTs personally reviewed again. Severe obstruction with mild reduction in DLCO.    --------------------------------------    Assessment and Plan:  Blayne Gonzalez is a 64-year-old male with history of COPD and multiple pulmonary nodules was seen for follow-up of COPD.      COPD - Gold Stage A: Severe obstruction on pulmonary function tests with significant bronchodilator response noted on previous test.  He continues to be quite stable from a respiratory standpoint.  No recent exacerbations. No change to current regimen of Advair. Home action plan as needed (prednisone 40 mg x 5 days and doxycycline 100 mg twice a day x 7 days).     - Flu shot & PPSV23 given today  - At next visit, can consider changes from ICS/LABA to LAMA/LABA.     Pulmonary nodules: Repeat CT today is reassuring with resolution of the newest nodule that was noted in 2020 and stability of all others. Plan for a repeat chest CT in 1 year for lung cancer screening.    Discussed with Dr. Mitchell    Return to clinic in 1 year.     Zak Weiss MD  Pulmonary & Critical Care Fellow      Attestation signed by Zaina Mitchell MD at 11/29/2021  8:54 AM:  Pulmonary Staff:  I have  discussed Mr. Gonzalez's case with Dr. Weiss-Pulmonary/Critical Care Fellow; reviewed the patient's available PFT and radiographic imaging data and met with him.  I agree with the findings, assessment and recommendations as outlined below by Dr. Weiss.    Zaina Mitchell MD  #[id number]  11/24/2021

## 2021-11-24 NOTE — PROGRESS NOTES
Clinic Follow-Up Visit Note    HPI/Interval History: 63-year-old male with history of COPD and multiple pulmonary nodules was seen for follow-up of COPD.  He was seen by Dr. Brannon 1 year ago.  He has no change in symptoms.  He is able to perform all of his activities of daily living including manual work as a hernandez, going up and down stairs, walking long flat distances.  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  Majority of our discussion today focused on the risks and benefits of ongoing lung cancer screening.  He has had instances of extensive work-up as he has multiple pulmonary nodules regularly which has caused him an emotional and financial burden.  Full review of systems, 12 point, otherwise negative. Uses advair. Rarely uses albuterol. No recent need for steroids.    PMH:  Past Medical History:   Diagnosis Date     COPD (chronic obstructive pulmonary disease) (H)      Pulmonary nodule      Allergies:  No Known Allergies    Social History:  Social History     Socioeconomic History     Marital status: Single     Spouse name: Not on file     Number of children: Not on file     Years of education: Not on file     Highest education level: Not on file   Occupational History     Not on file   Tobacco Use     Smoking status: Former Smoker     Packs/day: 0.80     Years: 38.00     Pack years: 30.40     Types: Cigarettes     Quit date: 10/5/2015     Years since quittin.1     Smokeless tobacco: Never Used   Substance and Sexual Activity     Alcohol use: Yes     Alcohol/week: 0.0 standard drinks     Comment: one beer every few months     Drug use: Yes     Comment: history  of marijuana use within the year     Sexual activity: Yes     Partners: Female   Other Topics Concern     Parent/sibling w/ CABG, MI or angioplasty before 65F 55M? Not Asked   Social History Narrative    The patient has a 40 pk yr tobacco hx.  He has no ongoing active use.   Alcohol use is 1 alcoholic drinks per week.  He does use of occasional marijuana gummies.          He is employed as a hernandez and builder.         The patient is single.  Has 0 children.        Sexually active with one female partners.      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       The patient is single.  Has no children.    Hot Tub Exposure: NO  Recent Travel:  NO   Hx of incarceration:  NO  Bird Exposure:   NO  Animal Exposure:  NO  Inhalation Exposure:  YES, sheet metal grinding dust  Hobbies:   NO    Medications:  Current Outpatient Medications   Medication Sig Dispense Refill     albuterol (PROAIR HFA/PROVENTIL HFA/VENTOLIN HFA) 108 (90 Base) MCG/ACT inhaler Inhale 2 puffs into the lungs every 6 hours as needed for shortness of breath / dyspnea or wheezing 1 Inhaler 3     albuterol (PROAIR HFA/PROVENTIL HFA/VENTOLIN HFA) 108 (90 Base) MCG/ACT inhaler Inhale 2 puffs into the lungs every 6 hours as needed for shortness of breath / dyspnea or wheezing 1 Inhaler 11     dextromethorphan-guaiFENesin (MUCINEX DM)  MG per 12 hr tablet Take 1 tablet by mouth as needed  (Patient not taking: Reported on 7/25/2021)       fluticasone (FLONASE) 50 MCG/ACT nasal spray Spray 2 sprays into both nostrils daily 18 mL 3     fluticasone-salmeterol (ADVAIR) 100-50 MCG/DOSE inhaler Inhale 1 puff into the lungs 2 times daily 3 Inhaler 3     gabapentin (NEURONTIN) 300 MG capsule Take 1 capsule (300 mg) by  "mouth nightly as needed (back spasm) (Patient not taking: Reported on 7/25/2021) 30 capsule 1     ibuprofen (ADVIL,MOTRIN) 600 MG tablet Take 600 mg by mouth every 6 hours as needed. (Patient not taking: Reported on 7/25/2021)       Ibuprofen-diphenhydrAMINE Cit (EQ IBUPROFEN PM PO)        naproxen (NAPROSYN) 500 MG tablet Take 1 tablet (500 mg) by mouth 2 times daily (with meals) (Patient not taking: Reported on 7/25/2021) 28 tablet 0     sildenafil (VIAGRA) 100 MG tablet Take 1 tablet (100 mg) by mouth daily as needed (for erectile dysfunction) 15 tablet 8       Family History:  No family hx of lung cancer   No family hx of COPD or asthma    Review of Systems:  Complete 10 point ROS negative unless mentioned in HPI    Physical Exam:  /88   Pulse 87   Ht 1.753 m (5' 9\")   Wt 57.2 kg (126 lb)   SpO2 97%   BMI 18.61 kg/m    General:  Adult male;appears stated age; no acute distress; the patient is a good historian  HEENT:  NCAT; EOMI; No icterus; no injection; MMM  Neck: Supple, full range of motion, no lymphadenopathy  Pulm: cta, non labored  CV: RRR, no murmurs, rubs or gallops  Abdomen: Soft, NT, ND, + BS  Extremities:  No cyanosis or clubbing, no edema  Neuro: Alert and oriented x 3, moves all extremities no obvious weakness  Skin: No skin rashes noted    Labs and Radiology:    CT Chest Nov 24  FINDINGS: No contrast. Included thyroid appears grossly unremarkable.  There is atherosclerosis in the aorta and major branch vessels from  the arch. Coronary artery disease. Heart size normal. Thoracic aorta  and main pulmonary artery normal in size. No pleural or pericardial  effusion. The included upper abdomen appears grossly unremarkable. No  enlarged hilar, mediastinal or axillary lymph nodes.     Detailed review of the lungs shows calcification bilaterally. The  right upper lobe nodular opacity which previously measured 9 x 5 mm  and was a new finding on the prior examination is not evident on " this  examination, this may have been inflammatory nodule. The other nodules  appear similar including the noncalcified 6 mm right lower lobe nodule  (series 4 image 208). Minimal degenerative changes are present in the  thoracic spine with no suspicious sclerotic or lytic/destructive bony  lesion.                                                       IMPRESSION: ACR Lung-RADS 2: Benign behavior. Recommend 12 month  follow-up chest CT. Mild atherosclerosis. Granulomatous disease.     PFT's: Previous PFTs personally reviewed again. Severe obstruction with mild reduction in DLCO.    --------------------------------------    Assessment and Plan:  Blayne Gonzalez is a 64-year-old male with history of COPD and multiple pulmonary nodules was seen for follow-up of COPD.      COPD - Gold Stage A: Severe obstruction on pulmonary function tests with significant bronchodilator response noted on previous test.  He continues to be quite stable from a respiratory standpoint.  No recent exacerbations. No change to current regimen of Advair. Home action plan as needed (prednisone 40 mg x 5 days and doxycycline 100 mg twice a day x 7 days).     - Flu shot & PPSV23 given today  - At next visit, can consider changes from ICS/LABA to LAMA/LABA.     Pulmonary nodules: Repeat CT today is reassuring with resolution of the newest nodule that was noted in 2020 and stability of all others. Plan for a repeat chest CT in 1 year for lung cancer screening.    Discussed with Dr. Mitchell    Return to clinic in 1 year.     Zak Weiss MD  Pulmonary & Critical Care Fellow

## 2022-01-19 ENCOUNTER — NURSE TRIAGE (OUTPATIENT)
Dept: NURSING | Facility: CLINIC | Age: 67
End: 2022-01-19

## 2022-01-19 NOTE — TELEPHONE ENCOUNTER
Pt reporting he slipped and fell on the cement steps at the apartment building where he lives a few days ago.    Pt fell on his back. But more on the left side of the rib cage.  Since then the Pt has been in a lot of pain on the left side of his back and rib cage area.    It hurts to breath in and out.  It hurts to move.  Pt is not sleeping at all.   Because the left side of his rib cage is bothering him so much.  Since the Pt fell and landed on the left side of his back and rib cage.      No bones are sticking out and no bruising noted.    But Pt wondering if he broke some ribs on the left side from the fall.       Pt requesting to be seen in clinic and have X-rays done.    I suggested an Urgent care close to his home.    But Pt wants to be seen in clinic.     Please call the Pt back @ [phone] for further assistance.    Suzanne Spencer RN  Central Triage Red Flags/Med Refills      Reason for Disposition    Patient wants to be seen    Additional Information    Negative: Dangerous mechanism of injury (e.g., MVA, contact sports, trampoline, diving, fall > 10 feet or 3 meters) (Exception: back pain began > 1 hour after injury)    Negative: Weakness (i.e., paralysis, loss of muscle strength) of the leg(s) or foot and sudden onset after back injury    Negative: Numbness (i.e., loss of sensation) of the leg(s) or foot and sudden onset after back injury    Negative: Major bleeding (actively dripping or spurting) that can't be stopped    Negative: Bullet, knife or other serious penetrating wound    Negative: Shock suspected (e.g., cold/pale/clammy skin, too weak to stand, low BP, rapid pulse)    Negative: Sounds like a life-threatening emergency to the triager    Negative: Back pain not from an injury    Negative: Back pain from overuse (work, exercise, gardening) OR from twisting, lifting, or bending injury    Negative: SEVERE pain in kidney area (flank) that follows a direct blow to that site    Negative: Blood in urine  (red, pink, or tea-colored)    Negative: Unable to urinate (or only a few drops) > 4 hours and bladder feels very full (e.g., palpable bladder or strong urge to urinate)    Negative: Loss of bladder or bowel control (urine or bowel incontinence; wetting self, leaking stool) of new onset    Negative: Numbness (loss of sensation) in groin or rectal area    Negative: Skin is split open or gaping (length > 1/2 inch or 12 mm)    Negative: Puncture wound of back    Negative: Bleeding won't stop after 10 minutes of direct pressure (using correct technique)    Negative: Sounds like a serious injury to the triager    Negative: Weakness of a leg or foot (e.g., unable to bear weight, dragging foot)    Negative: Numbness of a leg or foot (i.e.., loss of sensation)    Negative: SEVERE back pain (e.g., excruciating, unable to do any normal activities) and not improved after pain medicine and CARE ADVICE    Negative: Pain radiates into the thigh or further down the leg now    Negative: Landed hard on feet or buttocks and pain over spine    Negative: Large swelling or bruise and size > palm of person's hand    Negative: No prior tetanus shots (or is not fully vaccinated) and any wound (e.g., cut or scrape)    Negative: HIV positive or severe immunodeficiency (severely weak immune system) and DIRTY cut or scrape    Negative: Patient is confused or is an unreliable provider of information (e.g., dementia, severe intellectual disability, alcohol intoxication)    Protocols used: BACK INJURY-A-OH

## 2022-01-20 ENCOUNTER — OFFICE VISIT (OUTPATIENT)
Dept: URGENT CARE | Facility: URGENT CARE | Age: 67
End: 2022-01-20
Payer: COMMERCIAL

## 2022-01-20 ENCOUNTER — ANCILLARY PROCEDURE (OUTPATIENT)
Dept: GENERAL RADIOLOGY | Facility: CLINIC | Age: 67
End: 2022-01-20
Attending: FAMILY MEDICINE
Payer: COMMERCIAL

## 2022-01-20 VITALS
RESPIRATION RATE: 16 BRPM | WEIGHT: 130 LBS | HEIGHT: 69 IN | TEMPERATURE: 98.1 F | BODY MASS INDEX: 19.26 KG/M2 | OXYGEN SATURATION: 97 % | DIASTOLIC BLOOD PRESSURE: 88 MMHG | HEART RATE: 90 BPM | SYSTOLIC BLOOD PRESSURE: 126 MMHG

## 2022-01-20 DIAGNOSIS — S29.9XXA TRAUMATIC INJURY OF RIB: Primary | ICD-10-CM

## 2022-01-20 DIAGNOSIS — S29.9XXA TRAUMATIC INJURY OF RIB: ICD-10-CM

## 2022-01-20 PROCEDURE — 99214 OFFICE O/P EST MOD 30 MIN: CPT | Performed by: FAMILY MEDICINE

## 2022-01-20 PROCEDURE — 71101 X-RAY EXAM UNILAT RIBS/CHEST: CPT | Mod: LT | Performed by: RADIOLOGY

## 2022-01-20 ASSESSMENT — MIFFLIN-ST. JEOR: SCORE: 1370.06

## 2022-01-20 NOTE — PATIENT INSTRUCTIONS
Use tylenol 325-650mg and/or ibuprofen 400-600mg every 4-6 hours for pain       Rib pain/bruised ribs can take 4-6 weeks to heal if you see no improvement in the next few eeks or if at any points symptoms worsen please come in right away to be evaluated    It may help to sleep on the unaffected side at night

## 2022-01-20 NOTE — PROGRESS NOTES
Assessment & Plan     Traumatic injury of rib  - XR Ribs & Chest Left G/E 3 Views       We discussed use of either naproxen or ibuprofen for discomfort additionally can use Tylenol on top of this. Expectations this can take 4 to 6 weeks to resolve in terms of discomfort. No evidence of any elbow fracture or rotator cuff injury. He has good range of motion of this left shoulder and is able to maintain full flexion and abduction.    F/u as needed    Chuck Quinn MD  [facility]    Noy Cisneros is a 66 year old male who presents to clinic today for the following health issues:  Chief Complaint   Patient presents with     Urgent Care     Fall     fell down stairs, might broken ribs last sunday, pain while cough, sneeze, lay on. Pain on the L lower back.     HPI    Patient was going down the stairs quickly approximately 4 days ago when he slipped in a contact with his elbow left hip and left flank area he has had notable pain on his left side. No previous history of fractures. Has not coughed up any blood. First apply pressure to this affected side.No bruising seen.   Hurts to cough and take a deep breath    Pain meds attempted: aleve and generic tylenol      Patient Active Problem List    Diagnosis Date Noted     COPD (chronic obstructive pulmonary disease) (H) 10/11/2011     Priority: Medium     Pulmonary nodule      Priority: Medium     Referred to [facility] by [facility] Results Team.       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/2006     Priority: Medium     Overview:   MRSA POSITIVE 11/13/06 ARM         Current Outpatient Medications   Medication     albuterol (PROAIR HFA/PROVENTIL HFA/VENTOLIN HFA) 108 (90 Base) MCG/ACT inhaler     dextromethorphan-guaiFENesin (MUCINEX DM)  MG per 12 hr tablet     fluticasone (FLONASE) 50 MCG/ACT nasal spray     fluticasone-salmeterol (ADVAIR) 100-50 MCG/DOSE inhaler     ibuprofen (ADVIL,MOTRIN) 600 MG  "tablet     Ibuprofen-diphenhydrAMINE Cit (EQ IBUPROFEN PM PO)     sildenafil (VIAGRA) 100 MG tablet     albuterol (PROAIR HFA/PROVENTIL HFA/VENTOLIN HFA) 108 (90 Base) MCG/ACT inhaler     gabapentin (NEURONTIN) 300 MG capsule     naproxen (NAPROSYN) 500 MG tablet     No current facility-administered medications for this visit.         Objective    /88   Pulse 90   Temp 98.1  F (36.7  C) (Temporal)   Resp 16   Ht 1.753 m (5' 9\")   Wt 59 kg (130 lb)   SpO2 97%   BMI 19.20 kg/m    Physical Exam     No bruising over left posterior rib area at area of pain along ribs 7-9. No flail chest.   Clear lungs bilaterally  L shoulder: 180 degrees flexion/abduction.     Results for orders placed or performed in visit on 01/20/22   XR Ribs & Chest Left G/E 3 Views     Status: None    Narrative    XR RIBS & CHEST LT 3VW 1/20/2022 1:15 PM     HISTORY: severe pain, difficulty breathing. pain 7-9 lateral  left/posterior; Traumatic injury of rib      Impression    IMPRESSION: There is no definitive evidence of an acute left-sided rib  fracture, with attention to the lower rib cage near the skin markers.    No evidence of pneumonia or pulmonary edema. Heart size and pulmonary  vasculature are normal. No pneumothorax or pleural effusion. Multiple  calcified granulomas are again seen.    YARELY RICKETTS MD         SYSTEM ID:  [id number]             The use of Dragon/[employer] dictation services may have been used to construct the content in this note; any grammatical or spelling errors are non-intentional. Please contact the author of this note directly if you are in need of any clarification.   "

## 2022-04-04 ENCOUNTER — TRANSFERRED RECORDS (OUTPATIENT)
Dept: MULTI SPECIALTY CLINIC | Facility: CLINIC | Age: 67
End: 2022-04-04
Payer: COMMERCIAL

## 2022-06-03 DIAGNOSIS — N52.9 ERECTILE DYSFUNCTION, UNSPECIFIED ERECTILE DYSFUNCTION TYPE: ICD-10-CM

## 2022-06-07 NOTE — TELEPHONE ENCOUNTER
sildenafil (VIAGRA) 100 MG tablet      Last Written Prescription Date:  8/23/2021  Last Fill Quantity: 15,   # refills: 8  Last Office Visit : 2/14/2020  Future Office visit:  none    Routing refill request to provider for review/approval because:  Failed medication protocol: appointment  - >18 months last visit  - no future visits  - message also routed to [facility] scheduling

## 2022-06-08 RX ORDER — SILDENAFIL 100 MG/1
100 TABLET, FILM COATED ORAL DAILY PRN
Qty: 15 TABLET | Refills: 0 | Status: SHIPPED | OUTPATIENT
Start: 2022-06-08 | End: 2022-06-21

## 2022-06-08 NOTE — TELEPHONE ENCOUNTER
Attempted to call pt to schedule overdue clinic visit for med refills but mailbox was full/not set up - unable to LVM

## 2022-06-17 DIAGNOSIS — J44.1 COPD EXACERBATION (H): ICD-10-CM

## 2022-06-17 RX ORDER — ALBUTEROL SULFATE 90 UG/1
2 AEROSOL, METERED RESPIRATORY (INHALATION) EVERY 6 HOURS PRN
Qty: 18 G | Refills: 5 | Status: SHIPPED | OUTPATIENT
Start: 2022-06-17 | End: 2022-06-21

## 2022-06-21 ENCOUNTER — OFFICE VISIT (OUTPATIENT)
Dept: INTERNAL MEDICINE | Facility: CLINIC | Age: 67
End: 2022-06-21
Payer: COMMERCIAL

## 2022-06-21 VITALS
HEART RATE: 80 BPM | HEIGHT: 68 IN | OXYGEN SATURATION: 99 % | DIASTOLIC BLOOD PRESSURE: 80 MMHG | WEIGHT: 129 LBS | SYSTOLIC BLOOD PRESSURE: 111 MMHG | BODY MASS INDEX: 19.55 KG/M2 | RESPIRATION RATE: 16 BRPM

## 2022-06-21 DIAGNOSIS — Z20.822 EXPOSURE TO 2019 NOVEL CORONAVIRUS: Primary | ICD-10-CM

## 2022-06-21 DIAGNOSIS — Z00.00 ENCOUNTER FOR MEDICAL EXAMINATION TO ESTABLISH CARE: ICD-10-CM

## 2022-06-21 DIAGNOSIS — N52.9 ERECTILE DYSFUNCTION, UNSPECIFIED ERECTILE DYSFUNCTION TYPE: ICD-10-CM

## 2022-06-21 DIAGNOSIS — Z29.9 ENCOUNTER FOR PREVENTIVE MEASURE: ICD-10-CM

## 2022-06-21 DIAGNOSIS — J44.1 COPD EXACERBATION (H): ICD-10-CM

## 2022-06-21 PROCEDURE — 99396 PREV VISIT EST AGE 40-64: CPT | Mod: CS | Performed by: NURSE PRACTITIONER

## 2022-06-21 RX ORDER — ALBUTEROL SULFATE 90 UG/1
2 AEROSOL, METERED RESPIRATORY (INHALATION) EVERY 6 HOURS PRN
Qty: 18 G | Refills: 11 | Status: SHIPPED | OUTPATIENT
Start: 2022-06-21 | End: 2022-06-21

## 2022-06-21 RX ORDER — ALBUTEROL SULFATE 90 UG/1
2 AEROSOL, METERED RESPIRATORY (INHALATION) EVERY 6 HOURS PRN
Qty: 18 G | Refills: 11 | Status: SHIPPED | OUTPATIENT
Start: 2022-06-21 | End: 2023-06-23

## 2022-06-21 RX ORDER — SILDENAFIL 100 MG/1
100 TABLET, FILM COATED ORAL DAILY PRN
Qty: 15 TABLET | Refills: 0 | Status: SHIPPED | OUTPATIENT
Start: 2022-06-21 | End: 2022-07-21

## 2022-06-21 NOTE — PROGRESS NOTES
Mr. Gonzalez is a 66 year old male with history of COPD, chronic bronchitis, and multiple pulmonary nodules who presents to \[facility]\"" care and for annual wellness exam.        Past Medical History:   Diagnosis Date     COPD (chronic obstructive pulmonary disease) (H)      Pulmonary nodule      Current Outpatient Medications   Medication     albuterol (PROAIR HFA/PROVENTIL HFA/VENTOLIN HFA) 108 (90 Base) MCG/ACT inhaler     dextromethorphan-guaiFENesin (MUCINEX DM)  MG per 12 hr tablet     fluticasone (FLONASE) 50 MCG/ACT nasal spray     fluticasone-salmeterol (ADVAIR) 100-50 MCG/DOSE inhaler     gabapentin (NEURONTIN) 300 MG capsule     ibuprofen (ADVIL,MOTRIN) 600 MG tablet     Ibuprofen-diphenhydrAMINE Cit (EQ IBUPROFEN PM PO)     naproxen (NAPROSYN) 500 MG tablet     sildenafil (VIAGRA) 100 MG tablet     No current facility-administered medications for this visit.       History of Present Illness:    COPD, Gold Stage A:  11/24/21 Pulmonology: Severe obstruction on pulmonary function tests with significant bronchodilator response noted on previous test.  He continues to be quite stable from a respiratory standpoint.  No recent exacerbations. No change to current regimen of Advair. Home action plan as needed (prednisone 40 mg x 5 days and doxycycline 100 mg twice a day x 7 days)  No concerns today.      Pulmonary Nodules:    11/24/21 Pulmonology: Repeat CT was reassuring with resolution of the newest nodule that was noted in 2020 and stability of all others. Plan for a repeat chest CT in 1 year (11/22) for lung cancer screening      Prevention    Has had covid vaccines X2.  Has not had boosters.  Declines today.    Shingles vaccine is due.  Declines today  Pneumovax in Nov 2021.  Next due 2026.  Last labs 2019.  Declines updates today.        Past Medical History:  Past Medical History:   Diagnosis Date     COPD (chronic obstructive pulmonary disease) (H)      Pulmonary nodule        Active Meds:  Current  "Outpatient Medications   Medication     albuterol (PROAIR HFA/PROVENTIL HFA/VENTOLIN HFA) 108 (90 Base) MCG/ACT inhaler     dextromethorphan-guaiFENesin (MUCINEX DM)  MG per 12 hr tablet     fluticasone (FLONASE) 50 MCG/ACT nasal spray     fluticasone-salmeterol (ADVAIR) 100-50 MCG/DOSE inhaler     gabapentin (NEURONTIN) 300 MG capsule     ibuprofen (ADVIL,MOTRIN) 600 MG tablet     Ibuprofen-diphenhydrAMINE Cit (EQ IBUPROFEN PM PO)     naproxen (NAPROSYN) 500 MG tablet     sildenafil (VIAGRA) 100 MG tablet     No current facility-administered medications for this visit.        Allergies:  Reviewed, refer to EMR    Family History:      Relevant Social History:  Employment:   Retired.  Works 30 hrs per week,   Relationship:  Single  Diet/Nutrition:   No concerns   Caffeine:  Coffee, 3-4 cups/day  Alcohol:   Rare  Tobacco/Vaping:  None  Street Drugs/Cannabis:  Occ gummies  Exercise/Activity:  Busy with his work      A full 10-pt Review of Systems was performed, verified and is negative except as documented in the HPI.  All health questionnaires were reviewed, verified and relevant information documented above.      Physical Exam:  Vitals:   /80 (BP Location: Right arm, Patient Position: Sitting, Cuff Size: Adult Regular)   Pulse 80   Resp 16   Ht 1.727 m (5' 8\")   Wt 58.5 kg (129 lb)   SpO2 99%   BMI 19.61 kg/m      Constitutional: Alert, oriented, pleasant, no acute distress  Head: Normocephalic, atraumatic  Eyes: Extra-ocular movements intact, pupils equally round and reactive bilaterally, no scleral icterus  ENT: Oropharynx clear, moist mucus membranes, good dentition  Neck: Supple, no lymphadenopathy, no thyromegaly, no JVD  Cardiovascular: Regular rate and rhythm, no murmurs, rubs or gallops, peripheral pulses full/symmetric  Respiratory: Good air movement bilaterally, lungs clear, no wheezes/rales/rhonchi  GI: Abdomen soft, bowel sounds present, nondistended, nontender, no " organomegaly or masses, no rebound/guarding  Musculoskeletal: No edema, normal muscle tone, normal gait  Neurologic: Alert and oriented, cranial nerves 2-12 grossly intact, strength 5/5 throughout, sensation to light touch intact  Skin: No rashes/lesions  Psychiatric: normal mentation, affect and mood      Assessment and Plan:    (Z00.00) Encounter for medical examination to establish care  (Z29.9) Encounter for preventive measure  (Z20.822) Exposure to 2019 novel coronavirus  (primary encounter diagnosis)  Comment: Requests covid antibody test  Plan: COVID-19 [place] [facility] Reflex      (J44.1) COPD   Comment: Currently stable  Plan: albuterol (PROAIR HFA/PROVENTIL HFA/VENTOLIN         HFA) 108 (90 Base) MCG/ACT inhaler,      (N52.9) Erectile dysfunction, unspecified erectile dysfunction type  Plan: sildenafil (VIAGRA) 100 MG tablet            #Routine Health Maintenence:  Immunizations (zoster, pneumovax, flu, Tdap, Hep A/B):   Most Recent Immunizations   Administered Date(s) Administered     COVID-19,PF,Pfizer (12+ Yrs) 05/28/2021     DTaP, Unspecified 03/19/2019     Flu, Unspecified 10/07/2015     HepB 03/19/2007     HepB-Adult 03/19/2007     Influenza (H1N1) 02/12/2010     Influenza (IIV3) PF 12/19/2012     Influenza Quad, Recombinant, pf(RIV4) (Flublok) 11/24/2021     Influenza Vaccine IM > 6 months Valent IIV4 (Alfuria,Fluzone) 10/03/2018     Influenza Vaccine, 6+MO IM (QUADRIVALENT W/PRESERVATIVES) 10/07/2015     Pneumo Conj 13-V (2010&after) 10/07/2015     Pneumococcal 23 valent 11/24/2021     TD (ADULT, 7+) 08/31/2006     TDAP Vaccine (Boostrix) 03/19/2019     Td (Adult), Adsorbed 08/31/2006     Lipids:   Recent Labs   Lab Test 03/19/19  0835   CHOL 200*   HDL 86   *   TRIG 50     PSA (50-75 yrs):   PSA   Date Value Ref Range Status   05/02/2013 0.73 0 - 4 ug/L Final     Comment:     PSA results are about 7% lower than our prior method due to a methodology   change   on August 30, 2011.         Return to clinic:  As needed and with no improvement, worsening, or new symptom development.     Options for treatment and follow-up care were reviewed with the patient. He engaged in the decision making process and verbalized understanding of the options discussed and agreed with the final plan.    Maddie Wiseman, ANP, [facility]  Nurse Practitioner

## 2022-06-21 NOTE — NURSING NOTE
"Blayne Gonzalez is a 66 year old male patient that presents today in clinic for the following:    Chief Complaint   Patient presents with     Physical     Annual Visit     He reports no new health concerns     Covid Concern     The patient's allergies and medications were reviewed as noted. A set of vitals were recorded as noted without incident: /80 (BP Location: Right arm, Patient Position: Sitting, Cuff Size: Adult Regular)   Pulse 80   Resp 16   Ht 1.727 m (5' 8\")   Wt 58.5 kg (129 lb)   SpO2 99%   BMI 19.61 kg/m  . The patient does not have any other questions for the provider.    Pablo Berry, EMT at 6:11 PM on 6/21/2022  "

## 2022-06-21 NOTE — PATIENT INSTRUCTIONS
Thank you for visiting the Primary Care Center today at the [facility]! The following is some information about our clinic:     Primary Care Center Frequently-Asked Questions    (1) How do I schedule appointments at the [facility]?     Primary Care--to schedule or make changes to an existing appointment, please call our primary care line at [phone].  Labs--to schedule a lab appointment at the [facility] you can use Bridge or call [phone]. If you have a [place] location that is closer to home, you can reach out to that location for scheduling options.   Imaging--if you need to schedule a CT, X-ray, MRI, ultrasound, or other imaging study, you can call [phone] to schedule at the [facility] or any other [facility] imaging location.   Referrals--if a referral to another specialty was ordered you can expect a phone call from their scheduling team. If you have not heard from them in a week, please call us or send us a Bridge message to check the status or get a scheduling number. Please note that this only applies to internal [facility] referrals. If the referral is external you would need to contact their office for scheduling.     (2) I have a question about my visit, who do I contact?     You can call us at the primary care line at [phone] to ask questions about your visit. You can also send a secure message through Bridge, which is reviewed by clinic staff. Please note that Bridge messages have a twenty-four to forty-eight business hour turnaround time and should not be used for urgent concerns.    (3) How will I get the results of my tests?    If you are signed up for Bridge all tests will be released to you within twenty-four hours of resulting. Please allow three to five days for your doctor to review your results and place a note interpreting the results. If you do not have [facility]Me[facility]shart you will receive your results  through mail seven to ten business days following the return of the tests. Please note that if there should be any urgent or concerning results that your doctor or their registered nurse will reach out to you the same day as the tests come back. If you have follow up questions about your results or would like to discuss the results in detail please schedule a follow up with your provider either in person or virtually.     (4) How do I get refills of my prescriptions?     You should always first contact your pharmacy for refills of your medications. If submitting a refill request on [employer], please be sure to submit the request only once--repeat requests can cause delays in refill. If you are requesting a NEW medication or a medication related to new symptoms you will need to schedule an appointment with a provider prior to approval. Please note: Routine medication refills have up to one to three business day turnaround whereas controlled substances refills have up to five to seven business day turnaround.    (5) I have new symptoms, what do I do?     If you are having an immediate medical emergency, you should dial 911 for assistance.   For anything urgent that needs to be seen within a few hours to one day you should visit a local urgent care for assistance.  For non-urgent symptoms that need to be seen within a few days to a week you can schedule with an available provider in primary care by going to [employer] or calling [phone].   If you are not sure how serious your symptoms are or you would like to receive medical advice you can always call [phone] to speak with a triage nurse.

## 2022-06-22 DIAGNOSIS — J44.9 COPD (CHRONIC OBSTRUCTIVE PULMONARY DISEASE) (H): Primary | ICD-10-CM

## 2022-06-22 NOTE — TELEPHONE ENCOUNTER
fluticasone-salmeterol (ADVAIR) 100-50 MCG/DOSE inhaler      Last Written Prescription Date:  2/12/20 by Raymond Brannon  Last Fill Quantity: 3 inhaler,   # refills: 3  Last Office Visit : 6/21/22  Future Office visit:  None scheduled    Routing refill request to provider for review/approval because:  See call center note - no pharmacy   rx needs to be hard copy with hand signature and faxed -    Call to Blayne, no answer, unable to leave message as voice mail box has not been set up

## 2022-06-22 NOTE — TELEPHONE ENCOUNTER
Health Call Center    Phone Message    May a detailed message be left on voicemail: yes     Reason for Call: Medication Refill Request    Has the patient contacted the pharmacy for the refill? Yes   Name of medication being requested: fluticasone-salmeterol (ADVAIR) 100-50 MCG/DOSE inhaler   Provider who prescribed the medication: Meghana Elliott RN  Pharmacy: [facility] [place] [facility]  Date medication is needed: asap    Patient saw Maddie Wiseman CNP APRN yesterday. Requested Rx to be renewed. Pharmacy informed patient that he had the wrong inhaler renewed. Needing Advair renewed.    Patient is requesting it to be filled at an online pharmacy in [place]. Patient said the medical assistant at the visit helped him with this last night. Patient did not provide the pharmacy name.      Action Taken: Message routed to:  [facility] NP    Travel Screening: Not Applicable

## 2022-07-06 ENCOUNTER — TELEPHONE (OUTPATIENT)
Dept: PULMONOLOGY | Facility: CLINIC | Age: 67
End: 2022-07-06

## 2022-07-06 NOTE — TELEPHONE ENCOUNTER
Called pt primary number no answer no vmail, called mobile number no answer lvm to contact me directly to schedule follow up for October/November. [phone]

## 2022-07-20 DIAGNOSIS — N52.9 ERECTILE DYSFUNCTION, UNSPECIFIED ERECTILE DYSFUNCTION TYPE: ICD-10-CM

## 2022-07-21 RX ORDER — SILDENAFIL 100 MG/1
100 TABLET, FILM COATED ORAL DAILY PRN
Qty: 15 TABLET | Refills: 2 | Status: SHIPPED | OUTPATIENT
Start: 2022-07-21 | End: 2022-10-28

## 2022-07-21 NOTE — TELEPHONE ENCOUNTER
sildenafil (VIAGRA) 100 MG tablet 15 tablet 0 6/21/2022           Last Written Prescription Date:  6-  Last Fill Quantity: 15,   # refills: 0  Last Office Visit : 6-  Future Office visit:  NONE    Kathleen M Doege RN

## 2022-07-22 NOTE — TELEPHONE ENCOUNTER
Health Call Center    Phone Message    May a detailed message be left on voicemail: yes     Reason for Call: Medication Question or concern regarding medication   Prescription Clarification  Name of Medication: sildenafil (VIAGRA) 100 MG tablet  Prescribing Provider: Maddie Wiseman NP   Pharmacy: [id number] - [place], [place] - [address]   What on the order needs clarification? Patient is confused why he was only given a couple refills since he is usually given about 8-10 refills. When patient was seen by Nohemi Centeno NP in the past, he was given 15 refills. Patient has recently establish care with Maddie Wiseman NP. Requesting more refills to be placed. Please call back patient for a status update.           Action Taken: Message routed to:  [facility]    Travel Screening: Not Applicable

## 2022-08-02 RX ORDER — FLUTICASONE PROPIONATE AND SALMETEROL 100; 50 UG/1; UG/1
1 POWDER RESPIRATORY (INHALATION) 2 TIMES DAILY
Qty: 60 EACH | Refills: 11 | Status: CANCELLED | OUTPATIENT
Start: 2022-08-02

## 2022-08-26 ENCOUNTER — TELEPHONE (OUTPATIENT)
Dept: PULMONOLOGY | Facility: CLINIC | Age: 67
End: 2022-08-26

## 2022-08-26 DIAGNOSIS — J44.9 CHRONIC OBSTRUCTIVE PULMONARY DISEASE, UNSPECIFIED COPD TYPE (H): Primary | ICD-10-CM

## 2022-08-26 NOTE — TELEPHONE ENCOUNTER
[facility]    Phone Message    May a detailed message be left on voicemail: yes     Reason for Call: Medication Refill Request    Has the patient contacted the pharmacy for the refill? Yes   Name of medication being requested: fluticasone-salmeterol (ADVAIR) 100-50 MCG/DOSE inhaler  Provider who prescribed the medication: Raymond Brannon MD  Pharmacy:   Date medication is needed: asap     Needs list of Dr. Victor's nurses          Action Taken: Message routed to:  [facility] Pulm    Travel Screening: Not Applicable

## 2022-08-29 RX ORDER — FLUTICASONE PROPIONATE AND SALMETEROL 100; 50 UG/1; UG/1
1 POWDER RESPIRATORY (INHALATION) EVERY 12 HOURS
Qty: 60 EACH | Refills: 0 | Status: SHIPPED | OUTPATIENT
Start: 2022-08-29 | End: 2022-11-01

## 2022-09-21 ENCOUNTER — OFFICE VISIT (OUTPATIENT)
Dept: PULMONOLOGY | Facility: CLINIC | Age: 67
End: 2022-09-21
Attending: INTERNAL MEDICINE
Payer: COMMERCIAL

## 2022-09-21 ENCOUNTER — ANCILLARY PROCEDURE (OUTPATIENT)
Dept: CT IMAGING | Facility: CLINIC | Age: 67
End: 2022-09-21
Attending: STUDENT IN AN ORGANIZED HEALTH CARE EDUCATION/TRAINING PROGRAM
Payer: COMMERCIAL

## 2022-09-21 VITALS — SYSTOLIC BLOOD PRESSURE: 149 MMHG | HEART RATE: 75 BPM | DIASTOLIC BLOOD PRESSURE: 76 MMHG | OXYGEN SATURATION: 96 %

## 2022-09-21 DIAGNOSIS — R91.1 PULMONARY NODULE: ICD-10-CM

## 2022-09-21 DIAGNOSIS — R91.1 PULMONARY NODULE: Primary | ICD-10-CM

## 2022-09-21 PROCEDURE — 99213 OFFICE O/P EST LOW 20 MIN: CPT | Mod: GC | Performed by: INTERNAL MEDICINE

## 2022-09-21 PROCEDURE — G0463 HOSPITAL OUTPT CLINIC VISIT: HCPCS

## 2022-09-21 PROCEDURE — 71250 CT THORAX DX C-: CPT | Mod: GC | Performed by: RADIOLOGY

## 2022-09-21 ASSESSMENT — PAIN SCALES - GENERAL: PAINLEVEL: NO PAIN (0)

## 2022-09-21 NOTE — PROGRESS NOTES
Clinic Follow-Up Visit Note    HPI/Interval History: 63-year-old male with history of COPD and multiple pulmonary nodules was seen for follow-up of COPD.  He was seen by Dr. Weiss 1 year ago.  He has no change in symptoms.  He is able to perform all of his activities of daily living including manual work.  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  Full review of systems, 12 point, otherwise negative. Uses advair. Rarely uses albuterol. No recent need for steroids.    PMH:  Past Medical History:   Diagnosis Date     COPD (chronic obstructive pulmonary disease) (H)      Pulmonary nodule      Allergies:  No Known Allergies    Social History:  Social History     Socioeconomic History     Marital status: Single     Spouse name: Not on file     Number of children: Not on file     Years of education: Not on file     Highest education level: Not on file   Occupational History     Not on file   Tobacco Use     Smoking status: Former Smoker     Packs/day: 0.80     Years: 38.00     Pack years: 30.40     Types: Cigarettes     Quit date: 10/5/2015     Years since quittin.9     Smokeless tobacco: Never Used   Substance and Sexual Activity     Alcohol use: Yes     Alcohol/week: 0.0 standard drinks     Comment: one beer every few months     Drug use: Yes     Comment: history  of marijuana use within the year     Sexual activity: Yes     Partners: Female   Other Topics Concern     Parent/sibling w/ CABG, MI or angioplasty before 65F 55M? Not Asked   Social History Narrative    The patient has a 40 pk yr tobacco hx.  He has no ongoing active use.  Alcohol use is 1 alcoholic drinks per week.  He does use of occasional marijuana gummies.          He is employed as a hernandez and builder.         The patient is single.  Has 0 children.        Sexually active with one female partners.      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       The patient is single.  Has no children.    Hot Tub Exposure: NO  Recent Travel:  NO   Hx of incarceration:  NO  Bird Exposure:   NO  Animal Exposure:  NO  Inhalation Exposure:  YES, sheet metal grinding dust  Hobbies:   NO    Medications:  Current Outpatient Medications   Medication Sig Dispense Refill     albuterol (PROAIR HFA/PROVENTIL HFA/VENTOLIN HFA) 108 (90 Base) MCG/ACT inhaler Inhale 2 puffs into the lungs every 6 hours as needed for shortness of breath / dyspnea or wheezing 18 g 11     dextromethorphan-guaiFENesin (MUCINEX DM)  MG per 12 hr tablet Take 1 tablet by mouth as needed  (Patient not taking: No sig reported)       fluticasone (FLONASE) 50 MCG/ACT nasal spray Spray 2 sprays into both nostrils daily (Patient not taking: No sig reported) 18 mL 3     fluticasone-salmeterol (ADVAIR) 100-50 MCG/ACT inhaler Inhale 1 puff into the lungs every 12 hours 60 each 0     fluticasone-salmeterol (ADVAIR) 100-50 MCG/DOSE inhaler Inhale 1 puff into the lungs 2 times daily 3 Inhaler 3     gabapentin (NEURONTIN) 300 MG capsule Take 1 capsule (300 mg) by mouth nightly as needed (back spasm) (Patient not taking: No sig reported) 30 capsule 1     ibuprofen (ADVIL,MOTRIN) 600 MG tablet Take 600 mg by mouth every 6 hours as needed        Ibuprofen-diphenhydrAMINE Cit (EQ IBUPROFEN PM PO)  (Patient not taking: No sig reported)       naproxen (NAPROSYN) 500 MG tablet Take 1 tablet (500 mg) by mouth 2 times daily (with meals) (Patient  not taking: No sig reported) 28 tablet 0     sildenafil (VIAGRA) 100 MG tablet Take 1 tablet (100 mg) by mouth daily as needed (ERECTILE DSYSFUNCTION) 15 tablet 2       Family History:  No family hx of lung cancer   No family hx of COPD or asthma    Review of Systems:  Complete 10 point ROS negative unless mentioned in HPI    Physical Exam:  BP (!) 149/76 (BP Location: Right arm, Cuff Size: Adult Regular)   Pulse 75   SpO2 96%   General:  Adult male;appears stated age; no acute distress; the patient is a good historian  HEENT:  NCAT; EOMI; No icterus; no injection; MMM  Neck: Supple, full range of motion, no lymphadenopathy  Pulm: cta, non labored  CV: RRR, no murmurs, rubs or gallops  Abdomen: Soft, NT, ND, + BS  Extremities:  No cyanosis or clubbing, no edema  Neuro: Alert and oriented x 3, moves all extremities no obvious weakness  Skin: No skin rashes noted    Labs and Radiology:    CT Chest 9/21/2022  IMPRESSION:   1. Lung-RADS category 2: Benign appearance or behavior. Recommend  continue annual screening with LDCT in 12 months.  2. Moderate emphysematous changes.  3. Sequelae of prior granulomatous disease    PFT's: Previous PFTs personally reviewed again. Severe obstruction with mild reduction in DLCO.    --------------------------------------    Assessment and Plan:  Blayne Gonzalez is a 64-year-old male with history of COPD and multiple pulmonary nodules was seen for follow-up of COPD.      COPD - Gold Stage A: Severe obstruction on pulmonary function tests with significant bronchodilator response noted on previous test.  He continues to be quite stable from a respiratory standpoint.  No recent exacerbations. No change to current regimen of Advair. Home action plan as needed (prednisone 40 mg x 5 days and doxycycline 100 mg twice a day x 7 days).     - Recommended to get his annual influenza vaccine completed for this season and also recommend to complete his COVID19 booster schedule      Pulmonary  nodules: Repeat CT today is reassuring. Plan for a repeat chest CT in 1 year for lung cancer screening.    Discussed with Dr. Rust    Return to clinic in 1 year.     Vicki Victor MD  Pulmonary & Critical Care Fellow    I saw and evaluated patient with Fellow.  Case discussed - agree with note.  I reviewed chest CT: stable nodules, per final radiology report.     ELENO RUST M.D.

## 2022-09-21 NOTE — LETTER
2022         RE: Blayne Gonzalez  [address]  [place] [id number]        Dear Colleague,    Thank you for referring your patient, Blayne Gonzalez, to the [facility] [place]. Please see a copy of my visit note below.    Clinic Follow-Up Visit Note    HPI/Interval History: 63-year-old male with history of COPD and multiple pulmonary nodules was seen for follow-up of COPD.  He was seen by Dr. Weiss 1 year ago.  He has no change in symptoms.  He is able to perform all of his activities of daily living including manual work.  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  Full review of systems, 12 point, otherwise negative. Uses advair. Rarely uses albuterol. No recent need for steroids.    PMH:  Past Medical History:   Diagnosis Date     COPD (chronic obstructive pulmonary disease) (H)      Pulmonary nodule      Allergies:  No Known Allergies    Social History:  Social History     Socioeconomic History     Marital status: Single     Spouse name: Not on file     Number of children: Not on file     Years of education: Not on file     Highest education level: Not on file   Occupational History     Not on file   Tobacco Use     Smoking status: Former Smoker     Packs/day: 0.80     Years: 38.00     Pack years: 30.40     Types: Cigarettes     Quit date: 10/5/2015     Years since quittin.9     Smokeless tobacco: Never Used   Substance and Sexual Activity     Alcohol use: Yes     Alcohol/week: 0.0 standard drinks     Comment: one beer every few months     Drug use: Yes     Comment: history  of marijuana use within the year     Sexual activity: Yes     Partners: Female   Other Topics Concern     Parent/sibling w/ CABG, MI or angioplasty before 65F 55M? Not Asked   Social History Narrative    The patient has a 40 pk yr tobacco hx.  He has no ongoing active use.  Alcohol use is 1 alcoholic  drinks per week.  He does use of occasional marijuana gummies.          He is employed as a hernandez and builder.         The patient is single.  Has 0 children.        Sexually active with one female partners.      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       The patient is single.  Has no children.    Hot Tub Exposure: NO  Recent Travel:  NO   Hx of incarceration:  NO  Bird Exposure:   NO  Animal Exposure:  NO  Inhalation Exposure:  YES, sheet metal grinding dust  Hobbies:   NO    Medications:  Current Outpatient Medications   Medication Sig Dispense Refill     albuterol (PROAIR HFA/PROVENTIL HFA/VENTOLIN HFA) 108 (90 Base) MCG/ACT inhaler Inhale 2 puffs into the lungs every 6 hours as needed for shortness of breath / dyspnea or wheezing 18 g 11     dextromethorphan-guaiFENesin (MUCINEX DM)  MG per 12 hr tablet Take 1 tablet by mouth as needed  (Patient not taking: No sig reported)       fluticasone (FLONASE) 50 MCG/ACT nasal spray Spray 2 sprays into both nostrils daily (Patient not taking: No sig reported) 18 mL 3     fluticasone-salmeterol (ADVAIR) 100-50 MCG/ACT inhaler Inhale 1 puff into the lungs every 12 hours 60 each 0     fluticasone-salmeterol (ADVAIR) 100-50 MCG/DOSE inhaler Inhale 1 puff into the lungs 2 times daily 3 Inhaler 3     gabapentin (NEURONTIN) 300 MG capsule Take 1 capsule (300 mg) by mouth nightly as needed (back spasm) (Patient not taking: No sig reported) 30 capsule  1     ibuprofen (ADVIL,MOTRIN) 600 MG tablet Take 600 mg by mouth every 6 hours as needed        Ibuprofen-diphenhydrAMINE Cit (EQ IBUPROFEN PM PO)  (Patient not taking: No sig reported)       naproxen (NAPROSYN) 500 MG tablet Take 1 tablet (500 mg) by mouth 2 times daily (with meals) (Patient not taking: No sig reported) 28 tablet 0     sildenafil (VIAGRA) 100 MG tablet Take 1 tablet (100 mg) by mouth daily as needed (ERECTILE DSYSFUNCTION) 15 tablet 2       Family History:  No family hx of lung cancer   No family hx of COPD or asthma    Review of Systems:  Complete 10 point ROS negative unless mentioned in HPI    Physical Exam:  BP (!) 149/76 (BP Location: Right arm, Cuff Size: Adult Regular)   Pulse 75   SpO2 96%   General:  Adult male;appears stated age; no acute distress; the patient is a good historian  HEENT:  NCAT; EOMI; No icterus; no injection; MMM  Neck: Supple, full range of motion, no lymphadenopathy  Pulm: cta, non labored  CV: RRR, no murmurs, rubs or gallops  Abdomen: Soft, NT, ND, + BS  Extremities:  No cyanosis or clubbing, no edema  Neuro: Alert and oriented x 3, moves all extremities no obvious weakness  Skin: No skin rashes noted    Labs and Radiology:    CT Chest 9/21/2022  IMPRESSION:   1. Lung-RADS category 2: Benign appearance or behavior. Recommend  continue annual screening with LDCT in 12 months.  2. Moderate emphysematous changes.  3. Sequelae of prior granulomatous disease    PFT's: Previous PFTs personally reviewed again. Severe obstruction with mild reduction in DLCO.    --------------------------------------    Assessment and Plan:  Blayne Gonzalez is a 64-year-old male with history of COPD and multiple pulmonary nodules was seen for follow-up of COPD.      COPD - Gold Stage A: Severe obstruction on pulmonary function tests with significant bronchodilator response noted on previous test.  He continues to be quite stable from a respiratory standpoint.  No recent exacerbations. No  change to current regimen of Advair. Home action plan as needed (prednisone 40 mg x 5 days and doxycycline 100 mg twice a day x 7 days).     - Recommended to get his annual influenza vaccine completed for this season and also recommend to complete his COVID19 booster schedule      Pulmonary nodules: Repeat CT today is reassuring. Plan for a repeat chest CT in 1 year for lung cancer screening.    Discussed with Dr. Rust    Return to clinic in 1 year.     Vicki Victor MD  Pulmonary & Critical Care Fellow    I saw and evaluated patient with Fellow.  Case discussed - agree with note.  I reviewed chest CT: stable nodules, per final radiology report.     ELENO RUST M.D.      Again, thank you for allowing me to participate in the care of your patient.        Sincerely,        Vicki Victor MD

## 2022-09-21 NOTE — NURSING NOTE
Chief Complaint   Patient presents with     Follow Up     Yearly follow up      Vitals were taken and medications were reconciled.     Zenaida Borden RMA  3:58 PM

## 2022-10-28 ENCOUNTER — TELEPHONE (OUTPATIENT)
Dept: INTERNAL MEDICINE | Facility: CLINIC | Age: 67
End: 2022-10-28

## 2022-10-28 DIAGNOSIS — N52.9 ERECTILE DYSFUNCTION, UNSPECIFIED ERECTILE DYSFUNCTION TYPE: ICD-10-CM

## 2022-10-28 RX ORDER — SILDENAFIL 100 MG/1
TABLET, FILM COATED ORAL
Qty: 15 TABLET | Refills: 2 | Status: SHIPPED | OUTPATIENT
Start: 2022-10-28 | End: 2023-01-25

## 2022-10-28 NOTE — TELEPHONE ENCOUNTER
[facility]    Phone Message    May a detailed message be left on voicemail: yes     Reason for Call: Medication Refill Request    Has the patient contacted the pharmacy for the refill? Yes   Name of medication being requested: sildenafil (VIAGRA) 100 MG tablet  Provider who prescribed the medication: Dr. Centeno  Pharmacy: [id number] - [place], [place] - [address] [facility]  Date medication is needed: 10/29/22         Action Taken: Message routed to:  [facility]    Travel Screening: Not Applicable

## 2022-10-31 ENCOUNTER — ALLIED HEALTH/NURSE VISIT (OUTPATIENT)
Dept: INTERNAL MEDICINE | Facility: CLINIC | Age: 67
End: 2022-10-31
Payer: COMMERCIAL

## 2022-10-31 DIAGNOSIS — Z23 NEED FOR VACCINATION: Primary | ICD-10-CM

## 2022-10-31 PROCEDURE — 90686 IIV4 VACC NO PRSV 0.5 ML IM: CPT

## 2022-10-31 PROCEDURE — 90471 IMMUNIZATION ADMIN: CPT

## 2022-10-31 NOTE — PROGRESS NOTES
Blayne Gonzalez received the Flu shot today in clinic at the request of the patient. The immunization was given under the supervision of Dr. Cotton if assistance was needed. The immunization site was cleaned with an alcohol prep wipe. The immunization was given without incident--see immunization list for administration details. No swelling or redness was observed at the site of injection after the immunization was given. The patient was advised to remain in [facility] lobby for 15 minutes after the injection in case of an averse reaction.     Kerry Clement, EMT at 2:34 PM on 10/31/2022.

## 2022-11-01 ENCOUNTER — TELEPHONE (OUTPATIENT)
Dept: PULMONOLOGY | Facility: CLINIC | Age: 67
End: 2022-11-01

## 2022-11-01 DIAGNOSIS — J44.9 CHRONIC OBSTRUCTIVE PULMONARY DISEASE, UNSPECIFIED COPD TYPE (H): ICD-10-CM

## 2022-11-01 RX ORDER — FLUTICASONE PROPIONATE AND SALMETEROL 100; 50 UG/1; UG/1
1 POWDER RESPIRATORY (INHALATION) EVERY 12 HOURS
Qty: 60 EACH | Refills: 3 | Status: SHIPPED | OUTPATIENT
Start: 2022-11-01 | End: 2023-06-23

## 2022-11-01 NOTE — TELEPHONE ENCOUNTER
Writer returned patient call as requested. Patient now on new insurance and needs Advair prescription called in to [facility] pharmacy in [place]. Writer placed order as requested.

## 2022-11-01 NOTE — TELEPHONE ENCOUNTER
[facility]    Phone Message    May a detailed message be left on voicemail: yes     Reason for Call: Other: needs prescription sent over to [facility] -  call back needed      Action Taken: Message routed to:  [facility] pulm    Travel Screening: Not Applicable

## 2023-01-21 DIAGNOSIS — N52.9 ERECTILE DYSFUNCTION, UNSPECIFIED ERECTILE DYSFUNCTION TYPE: ICD-10-CM

## 2023-01-25 RX ORDER — SILDENAFIL 100 MG/1
TABLET, FILM COATED ORAL
Qty: 15 TABLET | Refills: 2 | Status: SHIPPED | OUTPATIENT
Start: 2023-01-25

## 2023-01-25 NOTE — TELEPHONE ENCOUNTER
SILDENAFIL CITRATE 100MG TABS  Passed protocol    Office Visit  6/21/2022  [facility] Maddie Butts NP  Nurse Practitioner - [facility]     Last filled 10/28/22, 15 tabs with 2 refills

## 2023-06-19 ENCOUNTER — TELEPHONE (OUTPATIENT)
Dept: PULMONOLOGY | Facility: CLINIC | Age: 68
End: 2023-06-19
Payer: COMMERCIAL

## 2023-06-19 NOTE — TELEPHONE ENCOUNTER
Patient Contacted     Appointment type: RTN  Provider: GEORGIE WEBBER  Return date: 10/23/23  Specialty phone number: [phone]  Additional appointment(s) needed: CT CHEST WO  Additonal Notes: NA

## 2023-06-20 ENCOUNTER — TELEPHONE (OUTPATIENT)
Dept: PULMONOLOGY | Facility: CLINIC | Age: 68
End: 2023-06-20
Payer: COMMERCIAL

## 2023-06-23 ENCOUNTER — TELEPHONE (OUTPATIENT)
Dept: PULMONOLOGY | Facility: CLINIC | Age: 68
End: 2023-06-23
Payer: COMMERCIAL

## 2023-06-23 DIAGNOSIS — J44.1 COPD EXACERBATION (H): ICD-10-CM

## 2023-06-23 DIAGNOSIS — J44.9 CHRONIC OBSTRUCTIVE PULMONARY DISEASE, UNSPECIFIED COPD TYPE (H): ICD-10-CM

## 2023-06-23 RX ORDER — ALBUTEROL SULFATE 90 UG/1
2 AEROSOL, METERED RESPIRATORY (INHALATION) EVERY 6 HOURS PRN
Qty: 18 G | Refills: 4 | Status: SHIPPED | OUTPATIENT
Start: 2023-06-23 | End: 2023-08-31

## 2023-06-23 RX ORDER — FLUTICASONE PROPIONATE AND SALMETEROL 100; 50 UG/1; UG/1
1 POWDER RESPIRATORY (INHALATION) EVERY 12 HOURS
Qty: 60 EACH | Refills: 4 | Status: SHIPPED | OUTPATIENT
Start: 2023-06-23 | End: 2023-09-06

## 2023-06-23 NOTE — TELEPHONE ENCOUNTER
Spoke with patient and confirmed appts on 10/27/23. Pt requested mailed itinerary. Pt's street address was verified.

## 2023-07-15 DIAGNOSIS — J44.1 COPD EXACERBATION (H): ICD-10-CM

## 2023-07-19 RX ORDER — ALBUTEROL SULFATE 90 UG/1
AEROSOL, METERED RESPIRATORY (INHALATION)
Qty: 8.5 G | Refills: 11 | OUTPATIENT
Start: 2023-07-19

## 2023-07-19 NOTE — TELEPHONE ENCOUNTER
ALBUTEROL SULFATE  AERS      Last Written Prescription Date:  6/23/23  Last Fill Quantity: 18g,   # refills: 4  Last Office Visit : 6/21/22  Future Office visit:  none      Verified same pharmacy. Refused, pt should have refills available.

## 2023-08-31 DIAGNOSIS — J44.1 COPD EXACERBATION (H): ICD-10-CM

## 2023-08-31 RX ORDER — ALBUTEROL SULFATE 90 UG/1
2 AEROSOL, METERED RESPIRATORY (INHALATION) EVERY 6 HOURS PRN
Qty: 18 G | Refills: 4 | Status: SHIPPED | OUTPATIENT
Start: 2023-08-31 | End: 2023-11-28

## 2023-09-06 DIAGNOSIS — J44.9 CHRONIC OBSTRUCTIVE PULMONARY DISEASE, UNSPECIFIED COPD TYPE (H): ICD-10-CM

## 2023-09-06 RX ORDER — FLUTICASONE PROPIONATE AND SALMETEROL 100; 50 UG/1; UG/1
1 POWDER RESPIRATORY (INHALATION) EVERY 12 HOURS
Qty: 60 EACH | Refills: 4 | Status: SHIPPED | OUTPATIENT
Start: 2023-09-06 | End: 2023-11-28

## 2023-10-27 ENCOUNTER — ANCILLARY PROCEDURE (OUTPATIENT)
Dept: CT IMAGING | Facility: CLINIC | Age: 68
End: 2023-10-27
Attending: INTERNAL MEDICINE
Payer: COMMERCIAL

## 2023-10-27 ENCOUNTER — OFFICE VISIT (OUTPATIENT)
Dept: PULMONOLOGY | Facility: CLINIC | Age: 68
End: 2023-10-27
Attending: INTERNAL MEDICINE
Payer: COMMERCIAL

## 2023-10-27 VITALS — DIASTOLIC BLOOD PRESSURE: 91 MMHG | OXYGEN SATURATION: 96 % | SYSTOLIC BLOOD PRESSURE: 154 MMHG | HEART RATE: 72 BPM

## 2023-10-27 DIAGNOSIS — F17.211 NICOTINE DEPENDENCE, CIGARETTES, IN REMISSION: ICD-10-CM

## 2023-10-27 DIAGNOSIS — Z87.891 PERSONAL HISTORY OF TOBACCO USE, PRESENTING HAZARDS TO HEALTH: ICD-10-CM

## 2023-10-27 DIAGNOSIS — Z23 NEED FOR VACCINATION: ICD-10-CM

## 2023-10-27 DIAGNOSIS — R91.1 PULMONARY NODULE: ICD-10-CM

## 2023-10-27 DIAGNOSIS — J44.9 MODERATE COPD (CHRONIC OBSTRUCTIVE PULMONARY DISEASE) (H): Primary | ICD-10-CM

## 2023-10-27 PROCEDURE — G0463 HOSPITAL OUTPT CLINIC VISIT: HCPCS | Mod: 25 | Performed by: INTERNAL MEDICINE

## 2023-10-27 PROCEDURE — G0009 ADMIN PNEUMOCOCCAL VACCINE: HCPCS | Performed by: INTERNAL MEDICINE

## 2023-10-27 PROCEDURE — 250N000011 HC RX IP 250 OP 636: Performed by: INTERNAL MEDICINE

## 2023-10-27 PROCEDURE — 99214 OFFICE O/P EST MOD 30 MIN: CPT | Mod: GC | Performed by: INTERNAL MEDICINE

## 2023-10-27 PROCEDURE — 71271 CT THORAX LUNG CANCER SCR C-: CPT | Performed by: RADIOLOGY

## 2023-10-27 PROCEDURE — 90662 IIV NO PRSV INCREASED AG IM: CPT | Performed by: INTERNAL MEDICINE

## 2023-10-27 PROCEDURE — G0008 ADMIN INFLUENZA VIRUS VAC: HCPCS | Performed by: INTERNAL MEDICINE

## 2023-10-27 PROCEDURE — 90677 PCV20 VACCINE IM: CPT | Performed by: INTERNAL MEDICINE

## 2023-10-27 RX ADMIN — INFLUENZA A VIRUS A/VICTORIA/4897/2022 IVR-238 (H1N1) ANTIGEN (FORMALDEHYDE INACTIVATED), INFLUENZA A VIRUS A/DARWIN/9/2021 SAN-010 (H3N2) ANTIGEN (FORMALDEHYDE INACTIVATED), INFLUENZA B VIRUS B/PHUKET/3073/2013 ANTIGEN (FORMALDEHYDE INACTIVATED), AND INFLUENZA B VIRUS B/MICHIGAN/01/2021 ANTIGEN (FORMALDEHYDE INACTIVATED) 0.7 ML: 60; 60; 60; 60 INJECTION, SUSPENSION INTRAMUSCULAR at 17:10

## 2023-10-27 RX ADMIN — PNEUMOCOCCAL 20-VALENT CONJUGATE VACCINE 0.5 ML
2.2; 2.2; 2.2; 2.2; 2.2; 2.2; 2.2; 2.2; 2.2; 2.2; 2.2; 2.2; 2.2; 2.2; 2.2; 2.2; 4.4; 2.2; 2.2; 2.2 INJECTION, SUSPENSION INTRAMUSCULAR at 17:10

## 2023-10-27 ASSESSMENT — PAIN SCALES - GENERAL: PAINLEVEL: NO PAIN (0)

## 2023-10-27 NOTE — PROGRESS NOTES
[facility]   Pulmonary Clinic Follow Up     Blayne Gonzalez MRN: [id number]  Date: 10/27/2023    CC: COPD follow up      HPI:  Blayne Gonzalez is a 67M former smoker with severe COPD and multiple pulmonary nodules here for annual follow up. Last seen by Dr. Victor 9/2022.     Interval History: None. No exacerbations, hospitalizations, use of steroids.     Today: He has no change in symptoms. 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 Denies cough, SOB, wheezing, CP. Complaint with Advair and Albuterol prn.     ROS: Ast stated in HPI     PMHx/PSHx:  Past Medical History:   Diagnosis Date    COPD (chronic obstructive pulmonary disease) (H)     Pulmonary nodule      Past Surgical History:   Procedure Laterality Date    cyst removed from nipple         Outpatient Medications:   albuterol (PROAIR HFA/PROVENTIL HFA/VENTOLIN HFA) 108 (90 Base) MCG/ACT inhaler, Inhale 2 puffs into the lungs every 6 hours as needed for shortness of breath or wheezing  fluticasone-salmeterol (ADVAIR) 100-50 MCG/ACT inhaler, Inhale 1 puff into the lungs every 12 hours  ibuprofen (ADVIL,MOTRIN) 600 MG tablet, Take 600 mg by mouth every 6 hours as needed   sildenafil (VIAGRA) 100 MG tablet, TAKE ONE TABLET BY MOUTH EVERY DAY AS NEEDED FOR ERECTILE DYSFUNCTION  dextromethorphan-guaiFENesin (MUCINEX DM)  MG per 12 hr tablet, Take 1 tablet by mouth as needed  (Patient not taking: Reported on 6/21/2022)  fluticasone (FLONASE) 50 MCG/ACT nasal spray, Spray 2 sprays into both nostrils daily (Patient not taking: Reported on 6/21/2022)  gabapentin (NEURONTIN) 300 MG capsule, Take 1 capsule (300 mg) by mouth nightly as needed (back spasm) (Patient not taking: Reported on 1/20/2022)  Ibuprofen-diphenhydrAMINE Cit (EQ IBUPROFEN PM PO),   naproxen (NAPROSYN) 500 MG tablet, Take 1 tablet (500 mg) by mouth  2 times daily (with meals) (Patient not taking: Reported on 7/25/2021)    No current facility-administered medications on file prior to visit.      Physical Exam:   BP (!) 154/91 (BP Location: Right arm, Patient Position: Sitting, Cuff Size: Adult Regular)   Pulse 72   SpO2 96%   - Gen: Aox3, NAD   - Lung: CTAB, no resp distress   - Ext: No BLE swelling  - Skin: No major rashes or lesions  - Neuro: CN grossly intact     Labs: Reviewed     Images/Studies:   10/27/23 LDCT  Multiple pulmonary nodules stable.   Emphysema.     10/2018 PFT  Severe airflow obstruction (FEV1 1.59L/48%)  Mild diffusion defect.       ASSESSMENT/PLAN:     # Severe COPD   - No recent exacerbations   - Continue Advair and Albuterol prn     # Pulmonary Nodules  # Former Smoker (quit 2015, >30 pack years)   - Yearly LDCT until 2030    # Health Maintenance  - Flu and Prevnar 20 today     RTC 1 year     Patient seen and discussed with Dr. Therese Arias MD   Pulmonary/Critical Care Fellow  Pager: [phone]    Attending note:  Patient seen, examined and discussed with Dr. Mensah. All data reviewed.  Agree with the assessment and plan as outlined in the above note.      Keya Saleh MD  [phone]

## 2023-10-27 NOTE — NURSING NOTE
Chief Complaint   Patient presents with    Follow Up     Return appt      Vitals were taken and medications were reconciled.     Zenaida Borden RMA  4:25 PM

## 2023-10-27 NOTE — PATIENT INSTRUCTIONS
Follow up in 1 year with Dr. Mensah with repeat low dose screening CT chest.   FYI: Pulmonary doctor is Brunilda Arias. Pulmonary nurse is Yasmeen Hobson.

## 2023-10-27 NOTE — LETTER
10/27/2023         RE: Blayne Gonzalez  [address]  [place] [address]        Dear Colleague,    Thank you for referring your patient, Blayne Gonzalez, to the [facility] [place]. Please see a copy of my visit note below.    [facility]   Pulmonary Clinic Follow Up     Blayne Gonzalez MRN: [id number]  Date: 10/27/2023    CC: COPD follow up      HPI:  Blayne Gonzalez is a 67M former smoker with severe COPD and multiple pulmonary nodules here for annual follow up. Last seen by Dr. Victor 9/2022.     Interval History: None. No exacerbations, hospitalizations, use of steroids.     Today: He has no change in symptoms. 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 Denies cough, SOB, wheezing, CP. Complaint with Advair and Albuterol prn.     ROS: Ast stated in HPI     PMHx/PSHx:  Past Medical History:   Diagnosis Date     COPD (chronic obstructive pulmonary disease) (H)      Pulmonary nodule      Past Surgical History:   Procedure Laterality Date     cyst removed from nipple         Outpatient Medications:   albuterol (PROAIR HFA/PROVENTIL HFA/VENTOLIN HFA) 108 (90 Base) MCG/ACT inhaler, Inhale 2 puffs into the lungs every 6 hours as needed for shortness of breath or wheezing  fluticasone-salmeterol (ADVAIR) 100-50 MCG/ACT inhaler, Inhale 1 puff into the lungs every 12 hours  ibuprofen (ADVIL,MOTRIN) 600 MG tablet, Take 600 mg by mouth every 6 hours as needed   sildenafil (VIAGRA) 100 MG tablet, TAKE ONE TABLET BY MOUTH EVERY DAY AS NEEDED FOR ERECTILE DYSFUNCTION  dextromethorphan-guaiFENesin (MUCINEX DM)  MG per 12 hr tablet, Take 1 tablet by mouth as needed  (Patient not taking: Reported on 6/21/2022)  fluticasone (FLONASE) 50 MCG/ACT nasal spray, Spray 2 sprays into both nostrils daily (Patient not  taking: Reported on 6/21/2022)  gabapentin (NEURONTIN) 300 MG capsule, Take 1 capsule (300 mg) by mouth nightly as needed (back spasm) (Patient not taking: Reported on 1/20/2022)  Ibuprofen-diphenhydrAMINE Cit (EQ IBUPROFEN PM PO),   naproxen (NAPROSYN) 500 MG tablet, Take 1 tablet (500 mg) by mouth 2 times daily (with meals) (Patient not taking: Reported on 7/25/2021)    No current facility-administered medications on file prior to visit.      Physical Exam:   BP (!) 154/91 (BP Location: Right arm, Patient Position: Sitting, Cuff Size: Adult Regular)   Pulse 72   SpO2 96%   - Gen: Aox3, NAD   - Lung: CTAB, no resp distress   - Ext: No BLE swelling  - Skin: No major rashes or lesions  - Neuro: CN grossly intact     Labs: Reviewed     Images/Studies:   10/27/23 LDCT  Multiple pulmonary nodules stable.   Emphysema.     10/2018 PFT  Severe airflow obstruction (FEV1 1.59L/48%)  Mild diffusion defect.       ASSESSMENT/PLAN:     # Severe COPD   - No recent exacerbations   - Continue Advair and Albuterol prn     # Pulmonary Nodules  # Former Smoker (quit 2015, >30 pack years)   - Yearly LDCT until 2030    # Health Maintenance  - Flu and Prevnar 20 today     RTC 1 year     Patient seen and discussed with Dr. Therese Arias MD   Pulmonary/Critical Care Fellow  Pager: [phone]    Attending note:  Patient seen, examined and discussed with Dr. Mensah. All data reviewed.  Agree with the assessment and plan as outlined in the above note.      Keya Saleh MD  [phone]      Again, thank you for allowing me to participate in the care of your patient.        Sincerely,        Brunilda Arias MD

## 2023-10-31 ENCOUNTER — TELEPHONE (OUTPATIENT)
Dept: PULMONOLOGY | Facility: CLINIC | Age: 68
End: 2023-10-31
Payer: COMMERCIAL

## 2023-10-31 NOTE — TELEPHONE ENCOUNTER
[facility] Call Center    Phone Message    May a detailed message be left on voicemail: yes     Reason for Call: Other: Incidental Finding   Divya is calling to report an incidental finding. Please follow-up asap. Thank you.     Action Taken: Other: Pulm    Travel Screening: Not Applicable

## 2023-11-02 NOTE — TELEPHONE ENCOUNTER
Called patient regarding 10/27 LDCT. No significant changes in multiple nodules. Follow up in 1 year.

## 2023-11-28 ENCOUNTER — TELEPHONE (OUTPATIENT)
Dept: PULMONOLOGY | Facility: CLINIC | Age: 68
End: 2023-11-28
Payer: COMMERCIAL

## 2023-11-28 DIAGNOSIS — J44.9 CHRONIC OBSTRUCTIVE PULMONARY DISEASE, UNSPECIFIED COPD TYPE (H): ICD-10-CM

## 2023-11-28 DIAGNOSIS — J44.1 COPD EXACERBATION (H): ICD-10-CM

## 2023-11-28 RX ORDER — FLUTICASONE PROPIONATE AND SALMETEROL 100; 50 UG/1; UG/1
1 POWDER RESPIRATORY (INHALATION) EVERY 12 HOURS
Qty: 60 EACH | Refills: 11 | Status: SHIPPED | OUTPATIENT
Start: 2023-11-28 | End: 2023-12-05

## 2023-11-28 RX ORDER — ALBUTEROL SULFATE 90 UG/1
2 AEROSOL, METERED RESPIRATORY (INHALATION) EVERY 6 HOURS PRN
Qty: 18 G | Refills: 11 | Status: SHIPPED | OUTPATIENT
Start: 2023-11-28 | End: 2023-12-05

## 2023-11-28 NOTE — TELEPHONE ENCOUNTER
[facility] Call [facility]    Phone Message    May a detailed message be left on voicemail: yes     Reason for Call: Medication Refill Request    Has the patient contacted the pharmacy for the refill? Yes     Name of medication being requested:   fluticasone-salmeterol (ADVAIR) 100-50 MCG/ACT inhaler   albuterol (PROAIR HFA/PROVENTIL HFA/VENTOLIN HFA) 108 (90 Base) MCG/ACT inhaler     Provider who prescribed the medication: [facility]    Pharmacy:   [facility] - [address]       Date medication is needed: 11/28/2023         Patient is wanting a 90 day supply on both medications. Patient is wanting to get a call back when this has been done. Please advise.         Action Taken: Message routed to:  [facility] Lung    Travel Screening: Not Applicable

## 2023-12-01 ENCOUNTER — OFFICE VISIT (OUTPATIENT)
Dept: URGENT CARE | Facility: URGENT CARE | Age: 68
End: 2023-12-01
Payer: COMMERCIAL

## 2023-12-01 VITALS
DIASTOLIC BLOOD PRESSURE: 85 MMHG | RESPIRATION RATE: 20 BRPM | OXYGEN SATURATION: 96 % | TEMPERATURE: 97.4 F | HEART RATE: 86 BPM | BODY MASS INDEX: 19.46 KG/M2 | SYSTOLIC BLOOD PRESSURE: 154 MMHG | WEIGHT: 128 LBS

## 2023-12-01 DIAGNOSIS — J44.1 CHRONIC OBSTRUCTIVE PULMONARY DISEASE WITH ACUTE EXACERBATION (H): Primary | ICD-10-CM

## 2023-12-01 PROCEDURE — 99214 OFFICE O/P EST MOD 30 MIN: CPT | Performed by: PHYSICIAN ASSISTANT

## 2023-12-01 RX ORDER — AZITHROMYCIN 250 MG/1
TABLET, FILM COATED ORAL
Qty: 6 TABLET | Refills: 0 | Status: SHIPPED | OUTPATIENT
Start: 2023-12-01 | End: 2023-12-06

## 2023-12-01 RX ORDER — CODEINE PHOSPHATE AND GUAIFENESIN 10; 100 MG/5ML; MG/5ML
1-2 SOLUTION ORAL
Qty: 50 ML | Refills: 0 | Status: SHIPPED | OUTPATIENT
Start: 2023-12-01

## 2023-12-01 RX ORDER — BENZONATATE 200 MG/1
200 CAPSULE ORAL 3 TIMES DAILY PRN
Qty: 30 CAPSULE | Refills: 0 | Status: SHIPPED | OUTPATIENT
Start: 2023-12-01

## 2023-12-01 RX ORDER — PREDNISONE 20 MG/1
40 TABLET ORAL DAILY
Qty: 10 TABLET | Refills: 0 | Status: SHIPPED | OUTPATIENT
Start: 2023-12-01 | End: 2023-12-06

## 2023-12-01 NOTE — PROGRESS NOTES
SUBJECTIVE:  Blayne Gonzalez is a 68 year old male who presents to the clinic today with a chief complaint of cough  for 1 week(s).  His cough is described as productive of yellow sputum.    The patient's symptoms are moderate and stable.  Associated symptoms include myalgias. The patient's symptoms are exacerbated by lying down  Patient has been using nothing  to improve symptoms.    Past Medical History:   Diagnosis Date    COPD (chronic obstructive pulmonary disease) (H)     Pulmonary nodule        Current Outpatient Medications   Medication Sig Dispense Refill    albuterol (PROAIR HFA/PROVENTIL HFA/VENTOLIN HFA) 108 (90 Base) MCG/ACT inhaler Inhale 2 puffs into the lungs every 6 hours as needed for shortness of breath or wheezing 18 g 11    azithromycin (ZITHROMAX) 250 MG tablet Take 2 tablets (500 mg) by mouth daily for 1 day, THEN 1 tablet (250 mg) daily for 4 days. 6 tablet 0    benzonatate (TESSALON) 200 MG capsule Take 1 capsule (200 mg) by mouth 3 times daily as needed for cough 30 capsule 0    predniSONE (DELTASONE) 20 MG tablet Take 2 tablets (40 mg) by mouth daily for 5 days 10 tablet 0    dextromethorphan-guaiFENesin (MUCINEX DM)  MG per 12 hr tablet Take 1 tablet by mouth as needed  (Patient not taking: Reported on 6/21/2022)      fluticasone (FLONASE) 50 MCG/ACT nasal spray Spray 2 sprays into both nostrils daily (Patient not taking: Reported on 6/21/2022) 18 mL 3    fluticasone-salmeterol (ADVAIR) 100-50 MCG/ACT inhaler Inhale 1 puff into the lungs every 12 hours (Patient not taking: Reported on 12/1/2023) 60 each 11    gabapentin (NEURONTIN) 300 MG capsule Take 1 capsule (300 mg) by mouth nightly as needed (back spasm) (Patient not taking: Reported on 1/20/2022) 30 capsule 1    ibuprofen (ADVIL,MOTRIN) 600 MG tablet Take 600 mg by mouth every 6 hours as needed  (Patient not taking: Reported on 12/1/2023)      Ibuprofen-diphenhydrAMINE Cit (EQ IBUPROFEN PM PO)  (Patient not taking: Reported on  2022)      naproxen (NAPROSYN) 500 MG tablet Take 1 tablet (500 mg) by mouth 2 times daily (with meals) (Patient not taking: Reported on 2021) 28 tablet 0    sildenafil (VIAGRA) 100 MG tablet TAKE ONE TABLET BY MOUTH EVERY DAY AS NEEDED FOR ERECTILE DYSFUNCTION (Patient not taking: Reported on 2023) 15 tablet 2       Social History     Tobacco Use    Smoking status: Former     Packs/day: 0.80     Years: 38.00     Additional pack years: 0.00     Total pack years: 30.40     Types: Cigarettes     Quit date: 10/5/2015     Years since quittin.1    Smokeless tobacco: Never   Substance Use Topics    Alcohol use: Yes     Alcohol/week: 0.0 standard drinks of alcohol     Comment: one beer every few months       ROS  Review of systems negative except as stated above.    OBJECTIVE:  BP (!) 154/85   Pulse 86   Temp 97.4  F (36.3  C) (Temporal)   Resp 20   Wt 58.1 kg (128 lb)   SpO2 96%   BMI 19.46 kg/m    GENERAL APPEARANCE: healthy, alert and no distress  EYES: EOMI,  PERRL, conjunctiva clear  HENT: ear canals and TM's normal.  Nose and mouth without ulcers, erythema or lesions  NECK: supple, nontender, no lymphadenopathy  RESP: lungs clear to auscultation - no rales, rhonchi or wheezes  CV: regular rates and rhythm, normal S1 S2, no murmur noted  NEURO: Normal strength and tone, sensory exam grossly normal,  normal speech and mentation  SKIN: no suspicious lesions or rashes    ASSESSMENT:   (J44.1) Chronic obstructive pulmonary disease with acute exacerbation (H)  (primary encounter diagnosis)  Plan: predniSONE (DELTASONE) 20 MG tablet,         azithromycin (ZITHROMAX) 250 MG tablet,         benzonatate (TESSALON) 200 MG capsule      Red flags and emergent follow up discussed, and understood by patient  Follow up with PCP if symptoms worsen or fail to improve

## 2023-12-01 NOTE — ADDENDUM NOTE
Addended by: NANI ARZATE on: 12/1/2023 10:35 AM     Modules accepted: Orders     I will STOP taking the medications listed below when I get home from the hospital:  None

## 2023-12-05 ENCOUNTER — TELEPHONE (OUTPATIENT)
Dept: PULMONOLOGY | Facility: CLINIC | Age: 68
End: 2023-12-05
Payer: COMMERCIAL

## 2023-12-05 DIAGNOSIS — J44.1 COPD EXACERBATION (H): ICD-10-CM

## 2023-12-05 DIAGNOSIS — J44.9 CHRONIC OBSTRUCTIVE PULMONARY DISEASE, UNSPECIFIED COPD TYPE (H): ICD-10-CM

## 2023-12-05 RX ORDER — FLUTICASONE PROPIONATE AND SALMETEROL 100; 50 UG/1; UG/1
1 POWDER RESPIRATORY (INHALATION) EVERY 12 HOURS
Qty: 180 EACH | Refills: 3 | Status: SHIPPED | OUTPATIENT
Start: 2023-12-05

## 2023-12-05 RX ORDER — ALBUTEROL SULFATE 90 UG/1
2 AEROSOL, METERED RESPIRATORY (INHALATION) EVERY 6 HOURS PRN
Qty: 54 G | Refills: 3 | Status: SHIPPED | OUTPATIENT
Start: 2023-12-05

## 2023-12-05 NOTE — TELEPHONE ENCOUNTER
[facility] Call Center    Phone Message    May a detailed message be left on voicemail: yes     Reason for Call: Other: Please update and send medication to [facility] [place] - [address] [place] [address] for fluticasone-salmeterol (ADVAIR) 100-50 MCG/ACT inhaler albuterol (PROAIR HFA/PROVENTIL HFA/VENTOLIN HFA) 108 (90 Base) MCG/ contact number [phone]      Action Taken: Other: pulm    Travel Screening: Not Applicable

## 2023-12-05 NOTE — TELEPHONE ENCOUNTER
Patient requesting 90 day supply of Advair and Albuterol be sent to [facility]. Writer modified original order that was sent to [facility]e per patient request and per RN refill protocol.

## 2024-04-26 NOTE — MR AVS SNAPSHOT
After Visit Summary   10/3/2018    Blayne Gonzalez    MRN: [id number]           Patient Information     Date Of Birth          1955        Visit Information        Provider Department      10/3/2018 2:55 PM Raymond Brannon MD [facility]        Today's Diagnoses     Pulmonary emphysema, unspecified emphysema type (H)          Care Instructions    1. Continue with Breo and albuterol   2. Ordered home action plan for you   3. Flu shot today    Return to clinic in 6 months           Follow-ups after your visit        Follow-up notes from your care team     Return in about 6 months (around 4/3/2019).      Your next 10 appointments already scheduled     Apr 03, 2019  3:35 PM CDT   (Arrive by 3:20 PM)   Return Visit with Raymond Brannon MD   [facility] ([facility])    [address] [place] [address] [phone]              Who to contact     If you have questions or need follow up information about today's clinic visit or your schedule please contact [facility] directly at [phone].  Normal or non-critical lab and imaging results will be communicated to you by MyChart, letter or phone within 4 business days after the clinic has received the results. If you do not hear from us within 7 days, please contact the clinic through [employer] or phone. If you have a critical or abnormal lab result, we will notify you by phone as soon as possible.  Submit refill requests through [employer] or call your pharmacy and they will forward the refill request to us. Please allow 3 business days for your refill to be completed.          Additional Information About Your Visit        MyChart Information     [employer] lets you send messages to your doctor, view your test results, renew your prescriptions, schedule appointments and more. To sign up, go to     04/26/24 0828   Pain Assessment   Pain Assessment Tool 0-10   Pain Score 10 - Worst Possible Pain   Pain Location/Orientation Location: Hip;Orientation: Bilateral;Location: Neck   Pain Radiating Towards na   Pain Onset/Description Onset: Ongoing   Effect of Pain on Daily Activities mobility   Patient's Stated Pain Goal No pain   Hospital Pain Intervention(s) Medication (See MAR)   Multiple Pain Sites No     Patient requested and was given PRN Motrin at 0828.   [email or web address] . Click on \"Log in\" on the left side of the screen, which will take you to the Welcome page. Then click on \"Sign up Now\" on the right side of the page.     You will be asked to enter the access code listed below, as well as some personal information. Please follow the directions to create your username and password.     Your access code is: [email or web address]  Expires: 2018  6:30 AM     Your access code will  in 90 days. If you need help or a new code, please call your [facility] clinic or [phone].        Care EveryWhere ID     This is your Care EveryWhere ID. This could be used by other organizations to access your [facility] medical records  [id number]        Your Vitals Were     Pulse Respirations Height Pulse Oximetry BMI (Body Mass Index)       76 17 1.765 m (5' 9.5\") 97% 18.19 kg/m2        Blood Pressure from Last 3 Encounters:   10/03/18 120/83   17 142/88   16 134/83    Weight from Last 3 Encounters:   10/03/18 56.7 kg (125 lb)   17 59.5 kg (131 lb 1.6 oz)   10/07/15 58.1 kg (128 lb)              Today, you had the following     No orders found for display         Today's Medication Changes          These changes are accurate as of 10/3/18 11:59 PM.  If you have any questions, ask your nurse or doctor.               Start taking these medicines.        Dose/Directions    doxycycline 50 MG capsule   Commonly known as:  VIBRAMYCIN   Used for:  Pulmonary emphysema, unspecified emphysema type (H)   Started by:  Raymond Brannon MD        Dose:  100 mg   Take 2 capsules (100 mg) by mouth 2 times daily for 7 days   Quantity:  14 capsule   Refills:  3       predniSONE 20 MG tablet   Commonly known as:  DELTASONE   Used for:  Pulmonary emphysema, unspecified emphysema type (H)   Started by:  Raymond Brannon MD        Dose:  40 mg   Take 2 tablets (40 mg) by mouth daily for 5 days   Quantity:  10 tablet   Refills:  3         These medicines have changed or " have updated prescriptions.        Dose/Directions    * albuterol 108 (90 Base) MCG/ACT inhaler   Commonly known as:  PROAIR HFA/PROVENTIL HFA/VENTOLIN HFA   This may have changed:  Another medication with the same name was added. Make sure you understand how and when to take each.   Used for:  COPD exacerbation (H)   Changed by:  Raymond Brannon MD        Dose:  2 puff   Inhale 2 puffs into the lungs every 6 hours as needed for shortness of breath / dyspnea or wheezing   Quantity:  1 Inhaler   Refills:  11       * albuterol 108 (90 Base) MCG/ACT inhaler   Commonly known as:  PROAIR HFA/PROVENTIL HFA/VENTOLIN HFA   This may have changed:  You were already taking a medication with the same name, and this prescription was added. Make sure you understand how and when to take each.   Used for:  Pulmonary emphysema, unspecified emphysema type (H)   Changed by:  Raymond Brannon MD        Dose:  2 puff   Inhale 2 puffs into the lungs every 6 hours as needed for shortness of breath / dyspnea or wheezing   Quantity:  1 Inhaler   Refills:  11       * Notice:  This list has 2 medication(s) that are the same as other medications prescribed for you. Read the directions carefully, and ask your doctor or other care provider to review them with you.         Where to get your medicines      These medications were sent to [facility] [address] [place] [address]     Phone:  [phone]     albuterol 108 (90 Base) MCG/ACT inhaler    doxycycline 50 MG capsule    fluticasone-vilanterol 100-25 MCG/INH inhaler    predniSONE 20 MG tablet                Primary Care Provider Office Phone # Fax #    Isaiah Odonnell [phone]       [address] [facility] [address]        Equal Access to Services     KARINA JULIO AH: Carter Rainey, ji licona, mega craig  savannahfanny minhabhi la'aan ah. So [facility] [phone].    ATENCIÓN: Si wilver jean, tiene a osborn disposición servicios gratuitos de asistencia lingüística. Morgan al [phone].    We comply with applicable federal civil rights laws and [place] laws. We do not discriminate on the basis of race, color, national origin, age, disability, sex, sexual orientation, or gender identity.            Thank you!     Thank you for choosing [facility]  for your care. Our goal is always to provide you with excellent care. Hearing back from our patients is one way we can continue to improve our services. Please take a few minutes to complete the written survey that you may receive in the mail after your visit with us. Thank you!             Your Updated Medication List - Protect others around you: Learn how to safely use, store and throw away your medicines at www.disposemymeds.org.          This list is accurate as of 10/3/18 11:59 PM.  Always use your most recent med list.                   Brand Name Dispense Instructions for use Diagnosis    * albuterol 108 (90 Base) MCG/ACT inhaler    PROAIR HFA/PROVENTIL HFA/VENTOLIN HFA    1 Inhaler    Inhale 2 puffs into the lungs every 6 hours as needed for shortness of breath / dyspnea or wheezing    COPD exacerbation (H)       * albuterol 108 (90 Base) MCG/ACT inhaler    PROAIR HFA/PROVENTIL HFA/VENTOLIN HFA    1 Inhaler    Inhale 2 puffs into the lungs every 6 hours as needed for shortness of breath / dyspnea or wheezing    Pulmonary emphysema, unspecified emphysema type (H)       dextromethorphan-guaiFENesin  MG per 12 hr tablet    MUCINEX DM     Take 1 tablet by mouth as needed        doxycycline 50 MG capsule    VIBRAMYCIN    14 capsule    Take 2 capsules (100 mg) by mouth 2 times daily for 7 days    Pulmonary emphysema, unspecified emphysema type (H)       fluticasone-vilanterol 100-25 MCG/INH inhaler    BREO ELLIPTA    1 Inhaler    Inhale 1 puff  into the lungs daily    Pulmonary emphysema, unspecified emphysema type (H)       ibuprofen 600 MG tablet    ADVIL/MOTRIN     Take 600 mg by mouth every 6 hours as needed.        predniSONE 20 MG tablet    DELTASONE    10 tablet    Take 2 tablets (40 mg) by mouth daily for 5 days    Pulmonary emphysema, unspecified emphysema type (H)       sildenafil 100 MG tablet    VIAGRA    8 tablet    Take 1 tablet (100 mg) by mouth daily as needed for erectile dysfunction Take 30 min to 60min before intercourse.    Erectile dysfunction       * Notice:  This list has 2 medication(s) that are the same as other medications prescribed for you. Read the directions carefully, and ask your doctor or other care provider to review them with you.

## 2024-05-01 ENCOUNTER — TELEPHONE (OUTPATIENT)
Dept: PULMONOLOGY | Facility: CLINIC | Age: 69
End: 2024-05-01
Payer: COMMERCIAL

## 2024-05-01 NOTE — TELEPHONE ENCOUNTER
Patient confirmed scheduled appointment:  Date: 10/23/24  Time: 4:15PM  Visit type: RPULM  Provider: GEORGIE WEBBER  Location: [facility]  Testing/imaging: CT CHEST lung cancer scrn  Additional notes: N/A

## 2024-10-30 DIAGNOSIS — J44.9 CHRONIC OBSTRUCTIVE PULMONARY DISEASE, UNSPECIFIED COPD TYPE (H): ICD-10-CM

## 2024-10-30 RX ORDER — FLUTICASONE PROPIONATE AND SALMETEROL 100; 50 UG/1; UG/1
1 POWDER RESPIRATORY (INHALATION) EVERY 12 HOURS
Qty: 180 EACH | Refills: 3 | Status: SHIPPED | OUTPATIENT
Start: 2024-10-30

## 2024-11-06 ENCOUNTER — OFFICE VISIT (OUTPATIENT)
Dept: PULMONOLOGY | Facility: CLINIC | Age: 69
End: 2024-11-06
Attending: INTERNAL MEDICINE
Payer: COMMERCIAL

## 2024-11-06 ENCOUNTER — ANCILLARY PROCEDURE (OUTPATIENT)
Dept: CT IMAGING | Facility: CLINIC | Age: 69
End: 2024-11-06
Attending: INTERNAL MEDICINE
Payer: COMMERCIAL

## 2024-11-06 VITALS
HEART RATE: 83 BPM | BODY MASS INDEX: 19.46 KG/M2 | SYSTOLIC BLOOD PRESSURE: 137 MMHG | DIASTOLIC BLOOD PRESSURE: 92 MMHG | HEIGHT: 68 IN | OXYGEN SATURATION: 96 % | RESPIRATION RATE: 16 BRPM

## 2024-11-06 DIAGNOSIS — R91.1 PULMONARY NODULE: ICD-10-CM

## 2024-11-06 DIAGNOSIS — J44.1 COPD EXACERBATION (H): Primary | ICD-10-CM

## 2024-11-06 DIAGNOSIS — J43.2 CENTRILOBULAR EMPHYSEMA (H): ICD-10-CM

## 2024-11-06 DIAGNOSIS — F17.219 CIGARETTE NICOTINE DEPENDENCE WITH NICOTINE-INDUCED DISORDER: ICD-10-CM

## 2024-11-06 DIAGNOSIS — F17.211 NICOTINE DEPENDENCE, CIGARETTES, IN REMISSION: ICD-10-CM

## 2024-11-06 PROCEDURE — 71271 CT THORAX LUNG CANCER SCR C-: CPT | Performed by: RADIOLOGY

## 2024-11-06 PROCEDURE — 99214 OFFICE O/P EST MOD 30 MIN: CPT | Mod: GC | Performed by: INTERNAL MEDICINE

## 2024-11-06 PROCEDURE — G0463 HOSPITAL OUTPT CLINIC VISIT: HCPCS | Performed by: INTERNAL MEDICINE

## 2024-11-06 ASSESSMENT — PAIN SCALES - GENERAL: PAINLEVEL_OUTOF10: NO PAIN (0)

## 2024-11-06 NOTE — LETTER
11/6/2024      Blayne Gonzalez  [address]  [place] [address]      Dear Colleague,    Thank you for referring your patient, Blayne Gonzalez, to the [facility]ENCE [facility] [place]. Please see a copy of my visit note below.    [facility]   Pulmonary Clinic Follow Up     Blayne Gonzalez MRN: [id number]  Date: 11/06/2024    CC: COPD follow up      HPI:  Blayne Gonzalez is a 67M former smoker with severe COPD and multiple pulmonary nodules here for annual follow up. LOV 10/27/23.     Interval History: None. No exacerbations, hospitalizations, use of steroids.     Today: He has no change in symptoms. 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 Denies cough, SOB, wheezing, CP. Compliant with Advair and Albuterol prn.     ROS: Ast stated in HPI     PMHx/PSHx:  Past Medical History:   Diagnosis Date     COPD (chronic obstructive pulmonary disease) (H)      Pulmonary nodule      Past Surgical History:   Procedure Laterality Date     cyst removed from nipple       Outpatient Medications:   Current Outpatient Medications   Medication Sig Dispense Refill     albuterol (PROAIR HFA/PROVENTIL HFA/VENTOLIN HFA) 108 (90 Base) MCG/ACT inhaler Inhale 2 puffs into the lungs every 6 hours as needed for shortness of breath or wheezing 54 g 3     benzonatate (TESSALON) 200 MG capsule Take 1 capsule (200 mg) by mouth 3 times daily as needed for cough 30 capsule 0     dextromethorphan-guaiFENesin (MUCINEX DM)  MG per 12 hr tablet Take 1 tablet by mouth as needed  (Patient not taking: Reported on 6/21/2022)       fluticasone (FLONASE) 50 MCG/ACT nasal spray Spray 2 sprays into both nostrils daily (Patient not taking: Reported on 6/21/2022) 18 mL 3     fluticasone-salmeterol (ADVAIR) 100-50 MCG/ACT inhaler Inhale 1 puff into the lungs  every 12 hours. 180 each 3     gabapentin (NEURONTIN) 300 MG capsule Take 1 capsule (300 mg) by mouth nightly as needed (back spasm) (Patient not taking: Reported on 1/20/2022) 30 capsule 1     guaiFENesin-codeine (ROBITUSSIN AC) 100-10 MG/5ML solution Take 5-10 mLs by mouth nightly as needed for cough 50 mL 0     ibuprofen (ADVIL,MOTRIN) 600 MG tablet Take 600 mg by mouth every 6 hours as needed  (Patient not taking: Reported on 12/1/2023)       Ibuprofen-diphenhydrAMINE Cit (EQ IBUPROFEN PM PO)  (Patient not taking: Reported on 6/21/2022)       naproxen (NAPROSYN) 500 MG tablet Take 1 tablet (500 mg) by mouth 2 times daily (with meals) (Patient not taking: Reported on 7/25/2021) 28 tablet 0     sildenafil (VIAGRA) 100 MG tablet TAKE ONE TABLET BY MOUTH EVERY DAY AS NEEDED FOR ERECTILE DYSFUNCTION (Patient not taking: Reported on 12/1/2023) 15 tablet 2     No current facility-administered medications for this visit.       Physical Exam:   There were no vitals taken for this visit.  - Gen: Aox3, NAD   - Lung: CTAB, no resp distress   - Ext: No BLE swelling  - Skin: No major rashes or lesions  - Neuro: CN grossly intact     Labs: Reviewed     Images/Studies:   11/6/24 LDCT  Stable calcified nodules      10/2018 PFT  Severe airflow obstruction (FEV1 1.59L/48%)  Mild diffusion defect.         ASSESSMENT/PLAN:     # Severe COPD   - No exacerbation or hospitalization in the past 2 years (did frequent get exacerbations when he was first diagnosed and still smoking). Is on ICS/LABA (historic medication). If peripheral eosinophils <100 (was 200-400 in 2009) will switch him to LABA/LAMA.   - Continue Advair for now and Albuterol prn     # Pulmonary Nodules  # Former Smoker (quit 2015, >30 pack years)   - Multiple stable calcified nodules. Had PET in 2019 and nodule was previously bx showing granuloma. 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 Insurance may not cover if received in clinic     RTC 1 year    Patient seen and discussed with Dr. Sophia Arias MD   Pulmonary/Critical Care Fellow  Pager: [phone]      I saw and evaluated patient with Fellow.  Case discussed - agree with note.  I reviewed chest CT: emphysema; mult calcified lung granulomas without change per final Ct report.    ELENO RUST M.D.      Again, thank you for allowing me to participate in the care of your patient.        Sincerely,        Brunilda Arias MD

## 2024-11-06 NOTE — Clinical Note
Dr. Sophia Kumar said the patient needs to have an appt for the [facility] lab after I told Jason I didn't need him anymore. Can one of you please call pt to schedule appt?   Thanks and sorry   Brunilda

## 2024-11-06 NOTE — PROGRESS NOTES
[facility]   Pulmonary Clinic Follow Up     Blayne Gonzalez MRN: [id number]  Date: 11/06/2024    CC: COPD follow up      HPI:  Blayne Gonzalez is a 67M former smoker with severe COPD and multiple pulmonary nodules here for annual follow up. LOV 10/27/23.     Interval History: None. No exacerbations, hospitalizations, use of steroids.     Today: He has no change in symptoms. 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 Denies cough, SOB, wheezing, CP. Complaint with Advair and Albuterol prn.     ROS: Ast stated in HPI     PMHx/PSHx:  Past Medical History:   Diagnosis Date    COPD (chronic obstructive pulmonary disease) (H)     Pulmonary nodule      Past Surgical History:   Procedure Laterality Date    cyst removed from nipple       Outpatient Medications:   Current Outpatient Medications   Medication Sig Dispense Refill    albuterol (PROAIR HFA/PROVENTIL HFA/VENTOLIN HFA) 108 (90 Base) MCG/ACT inhaler Inhale 2 puffs into the lungs every 6 hours as needed for shortness of breath or wheezing 54 g 3    benzonatate (TESSALON) 200 MG capsule Take 1 capsule (200 mg) by mouth 3 times daily as needed for cough 30 capsule 0    dextromethorphan-guaiFENesin (MUCINEX DM)  MG per 12 hr tablet Take 1 tablet by mouth as needed  (Patient not taking: Reported on 6/21/2022)      fluticasone (FLONASE) 50 MCG/ACT nasal spray Spray 2 sprays into both nostrils daily (Patient not taking: Reported on 6/21/2022) 18 mL 3    fluticasone-salmeterol (ADVAIR) 100-50 MCG/ACT inhaler Inhale 1 puff into the lungs every 12 hours. 180 each 3    gabapentin (NEURONTIN) 300 MG capsule Take 1 capsule (300 mg) by mouth nightly as needed (back spasm) (Patient not taking: Reported on 1/20/2022) 30 capsule 1    guaiFENesin-codeine (ROBITUSSIN AC) 100-10 MG/5ML solution Take 5-10 mLs by mouth nightly as  needed for cough 50 mL 0    ibuprofen (ADVIL,MOTRIN) 600 MG tablet Take 600 mg by mouth every 6 hours as needed  (Patient not taking: Reported on 12/1/2023)      Ibuprofen-diphenhydrAMINE Cit (EQ IBUPROFEN PM PO)  (Patient not taking: Reported on 6/21/2022)      naproxen (NAPROSYN) 500 MG tablet Take 1 tablet (500 mg) by mouth 2 times daily (with meals) (Patient not taking: Reported on 7/25/2021) 28 tablet 0    sildenafil (VIAGRA) 100 MG tablet TAKE ONE TABLET BY MOUTH EVERY DAY AS NEEDED FOR ERECTILE DYSFUNCTION (Patient not taking: Reported on 12/1/2023) 15 tablet 2     No current facility-administered medications for this visit.       Physical Exam:   There were no vitals taken for this visit.  - Gen: Aox3, NAD   - Lung: CTAB, no resp distress   - Ext: No BLE swelling  - Skin: No major rashes or lesions  - Neuro: CN grossly intact     Labs: Reviewed     Images/Studies:   11/6/24 LDCT  Stable calcified nodules      10/2018 PFT  Severe airflow obstruction (FEV1 1.59L/48%)  Mild diffusion defect.         ASSESSMENT/PLAN:     # Severe COPD   - No exacerbation or hospitalization in the past 2 years (did frequent get exacerbations when he was first diagnosed and still smoking). Is on ICS/LABA (historic medication). If peripheral eosinophils <100 (was 200-400 in 2009) will switch him to LABA/LAMA.   - Continue Advair for now and Albuterol prn     # Pulmonary Nodules  # Former Smoker (quit 2015, >30 pack years)   - Multiple stable calcified nodules. Had PET in 2019 and nodule was previously bx showing granuloma. 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 Insurance may not cover if received in clinic     RTC 1 year    Patient seen and discussed with Dr. Sophia Arias MD   Pulmonary/Critical Care Fellow  Pager: [phone]   discussed - agree with note.  I reviewed chest CT: emphysema; mult calcified lung granulomas without change per final Ct report.    ELENO RUST M.D.

## 2024-11-06 NOTE — NURSING NOTE
Chief Complaint   Patient presents with    RECHECK     Follow up visit      Jason Low, CMA CMA at 4:16 PM on 11/6/2024

## 2024-11-06 NOTE — PATIENT INSTRUCTIONS
- Blood work within the next month. Dr. Mensah will call you about potentially switching inhalers   - Repeat CT chest in 1 year   - Vaccines (Covid and Flu) at your local pharmacy. Check cost with insurance  - Follow up in 1 year.

## 2024-11-07 ENCOUNTER — TELEPHONE (OUTPATIENT)
Dept: PULMONOLOGY | Facility: CLINIC | Age: 69
End: 2024-11-07
Payer: COMMERCIAL

## 2024-11-07 NOTE — TELEPHONE ENCOUNTER
Patient confirmed scheduled appointment:  Date: 11/8/24  Time: 4:30PM  Visit type: LAB  Provider: [facility]  Location: [facility]  Testing/imaging: N/A  Additional notes: N/A

## 2024-11-08 ENCOUNTER — LAB (OUTPATIENT)
Dept: LAB | Facility: CLINIC | Age: 69
End: 2024-11-08
Payer: COMMERCIAL

## 2024-11-08 DIAGNOSIS — J43.2 CENTRILOBULAR EMPHYSEMA (H): ICD-10-CM

## 2024-11-08 LAB
BASOPHILS # BLD AUTO: 0.1 10E3/UL (ref 0–0.2)
BASOPHILS NFR BLD AUTO: 1 %
EOSINOPHIL # BLD AUTO: 0.4 10E3/UL (ref 0–0.7)
EOSINOPHIL NFR BLD AUTO: 6 %
ERYTHROCYTE [DISTWIDTH] IN BLOOD BY AUTOMATED COUNT: 12 % (ref 10–15)
HCT VFR BLD AUTO: 42.6 % (ref 40–53)
HGB BLD-MCNC: 14.7 G/DL (ref 13.3–17.7)
IMM GRANULOCYTES # BLD: 0 10E3/UL
IMM GRANULOCYTES NFR BLD: 0 %
LYMPHOCYTES # BLD AUTO: 1.3 10E3/UL (ref 0.8–5.3)
LYMPHOCYTES NFR BLD AUTO: 21 %
MCH RBC QN AUTO: 30.9 PG (ref 26.5–33)
MCHC RBC AUTO-ENTMCNC: 34.5 G/DL (ref 31.5–36.5)
MCV RBC AUTO: 90 FL (ref 78–100)
MONOCYTES # BLD AUTO: 0.5 10E3/UL (ref 0–1.3)
MONOCYTES NFR BLD AUTO: 9 %
NEUTROPHILS # BLD AUTO: 3.8 10E3/UL (ref 1.6–8.3)
NEUTROPHILS NFR BLD AUTO: 62 %
NRBC # BLD AUTO: 0 10E3/UL
NRBC BLD AUTO-RTO: 0 /100
PLATELET # BLD AUTO: 240 10E3/UL (ref 150–450)
RBC # BLD AUTO: 4.76 10E6/UL (ref 4.4–5.9)
WBC # BLD AUTO: 6 10E3/UL (ref 4–11)

## 2024-11-08 PROCEDURE — 85025 COMPLETE CBC W/AUTO DIFF WBC: CPT | Performed by: PATHOLOGY

## 2024-11-08 PROCEDURE — 36415 COLL VENOUS BLD VENIPUNCTURE: CPT | Performed by: PATHOLOGY

## 2024-11-12 ENCOUNTER — TELEPHONE (OUTPATIENT)
Dept: PULMONOLOGY | Facility: CLINIC | Age: 69
End: 2024-11-12

## 2024-11-12 DIAGNOSIS — J44.9 MODERATE COPD (CHRONIC OBSTRUCTIVE PULMONARY DISEASE) (H): Primary | ICD-10-CM

## 2024-11-12 DIAGNOSIS — J44.1 COPD EXACERBATION (H): ICD-10-CM

## 2024-11-12 RX ORDER — FLUTICASONE FUROATE, UMECLIDINIUM BROMIDE AND VILANTEROL TRIFENATATE 200; 62.5; 25 UG/1; UG/1; UG/1
1 POWDER RESPIRATORY (INHALATION) DAILY
Qty: 28 EACH | Refills: 3 | Status: SHIPPED | OUTPATIENT
Start: 2024-11-12 | End: 2025-01-03

## 2024-11-12 NOTE — CONFIDENTIAL NOTE
Called patient about CBC diff results. Eosinophil level of 400. He will remain on the ICS. Will switch him to a LAMA/LABA/ICS though. Will send Trelegy to pharmacy. If cost prohibitive, patient will remain on his current regiment until we get prior authorization.

## 2024-11-16 ENCOUNTER — OFFICE VISIT (OUTPATIENT)
Dept: URGENT CARE | Facility: URGENT CARE | Age: 69
End: 2024-11-16
Payer: COMMERCIAL

## 2024-11-16 VITALS
SYSTOLIC BLOOD PRESSURE: 137 MMHG | OXYGEN SATURATION: 90 % | HEART RATE: 118 BPM | TEMPERATURE: 97.8 F | DIASTOLIC BLOOD PRESSURE: 90 MMHG

## 2024-11-16 DIAGNOSIS — B96.89 ACUTE BACTERIAL BRONCHITIS: ICD-10-CM

## 2024-11-16 DIAGNOSIS — J44.1 COPD EXACERBATION (H): ICD-10-CM

## 2024-11-16 DIAGNOSIS — J20.8 ACUTE BACTERIAL BRONCHITIS: ICD-10-CM

## 2024-11-16 DIAGNOSIS — R05.9 COUGH, UNSPECIFIED TYPE: Primary | ICD-10-CM

## 2024-11-16 PROCEDURE — 99214 OFFICE O/P EST MOD 30 MIN: CPT | Performed by: PHYSICIAN ASSISTANT

## 2024-11-16 RX ORDER — CODEINE PHOSPHATE AND GUAIFENESIN 10; 100 MG/5ML; MG/5ML
1-2 SOLUTION ORAL EVERY 6 HOURS PRN
Qty: 118 ML | Refills: 0 | Status: SHIPPED | OUTPATIENT
Start: 2024-11-16

## 2024-11-16 RX ORDER — DOXYCYCLINE HYCLATE 100 MG
100 TABLET ORAL 2 TIMES DAILY
Qty: 20 TABLET | Refills: 0 | Status: SHIPPED | OUTPATIENT
Start: 2024-11-16 | End: 2024-11-26

## 2024-11-16 RX ORDER — PREDNISONE 20 MG/1
TABLET ORAL
Qty: 13 TABLET | Refills: 0 | Status: SHIPPED | OUTPATIENT
Start: 2024-11-16

## 2024-11-16 NOTE — PROGRESS NOTES
"Assessment & Plan     Cough, unspecified type    Robituissin ac for coughing  - guaiFENesin-codeine (ROBITUSSIN AC) 100-10 MG/5ML solution  Dispense: 118 mL; Refill: 0    COPD exacerbation (H)    Chronic obstructive pulmonary disease (COPD) is a lung disease that makes it hard to breathe. It is caused by damage to the lungs over many years, usually from smoking.  Sometimes your symptoms may get worse over a short time and stay bad. This is called a COPD exacerbation (say \"AjitBAYsotero\") or flare-up. A flare-up can be dangerous, so it's important to know what to do and take action. Your doctor can help you make a plan to manage flare-ups.  Symptoms of a flare-up include:  More shortness of breath than usual.  Coughing more than usual.  A change in the amount, color, or thickness of mucus.  Many irritants or triggers can cause a flare-up. Common causes are respiratory tract infections such as colds, flu, and pneumonia. Other causes include indoor and outdoor air pollution such as smoke, fumes, and soot.    - predniSONE (DELTASONE) 20 MG tablet  Dispense: 13 tablet; Refill: 0    Acute bacterial bronchitis    Bronchitis is inflammation of the bronchial tubes, which carry air to the lungs. The tubes swell and produce mucus, or phlegm. The mucus and inflamed bronchial tubes make you cough. You may have trouble breathing.  Most cases of bronchitis are caused by viruses but in your case we are more concerned about a bacterial infection. . Antibiotics usually are helpful in this situation to help you clear this bacterial infection.  Bronchitis usually develops rapidly and lasts about 2 to 3 weeks in otherwise healthy people.    - doxycycline hyclate (VIBRA-TABS) 100 MG tablet  Dispense: 20 tablet; Refill: 0       At today's visit with Blyane Gonzalez , we discussed results, diagnosis, medications and formulated a plan.  We also discussed red flags for immediate return to clinic/ER, as well as indications for follow up " with PCP if not improved in 3 days. Patient understood and agreed to plan. Blayne Gonzalez was discharged with stable vitals and has no further questions.     No follow-ups on file.    Prateek Samaniego, [facility], RUPERTO  [facility] [place]    Noy Cisneros is a 69 year old male who presents to clinic today for the following health issues:  Chief Complaint   Patient presents with    Urgent Care     - 1 Week  - Chest Congestion  - Cough  - Runny Nose  - Body Ache  - OTC Cough and Cold Med for symptoms       HPI  Review of Systems  Constitutional, HEENT, cardiovascular, pulmonary, gi and gu systems are negative, except as otherwise noted.  Constitutional, HEENT, cardiovascular, pulmonary, GI, , musculoskeletal, neuro, skin, endocrine and psych systems are negative, except as otherwise noted.      Objective    BP (!) 137/90   Pulse 118   Temp 97.8  F (36.6  C) (Oral)   SpO2 90%   Physical Exam   GENERAL: alert and no distress  EYES: Eyes grossly normal to inspection, PERRL and conjunctivae and sclerae normal  HENT: ear canals and TM's normal, nose and mouth without ulcers or lesions  NECK: no adenopathy, no asymmetry, masses, or scars  RESP: expiratory wheezes throughout and prolonged expiratory phase  CV: regular rate and rhythm, normal S1 S2, no S3 or S4, no murmur, click or rub, no peripheral edema  MS: no gross musculoskeletal defects noted, no edema  SKIN: no suspicious lesions or rashes  NEURO: Normal strength and tone, mentation intact and speech normal  PSYCH: mentation appears normal, affect normal/bright

## 2025-05-07 DIAGNOSIS — J44.9 MODERATE COPD (CHRONIC OBSTRUCTIVE PULMONARY DISEASE) (H): ICD-10-CM

## 2025-05-07 RX ORDER — FLUTICASONE FUROATE, UMECLIDINIUM BROMIDE AND VILANTEROL TRIFENATATE 200; 62.5; 25 UG/1; UG/1; UG/1
1 POWDER RESPIRATORY (INHALATION) DAILY
Qty: 84 EACH | Refills: 3 | Status: SHIPPED | OUTPATIENT
Start: 2025-05-07

## 2025-05-27 ENCOUNTER — TELEPHONE (OUTPATIENT)
Dept: PULMONOLOGY | Facility: CLINIC | Age: 70
End: 2025-05-27
Payer: COMMERCIAL

## 2025-05-27 DIAGNOSIS — J44.9 MODERATE COPD (CHRONIC OBSTRUCTIVE PULMONARY DISEASE) (H): ICD-10-CM

## 2025-05-27 RX ORDER — FLUTICASONE FUROATE, UMECLIDINIUM BROMIDE AND VILANTEROL TRIFENATATE 200; 62.5; 25 UG/1; UG/1; UG/1
1 POWDER RESPIRATORY (INHALATION) DAILY
Qty: 120 EACH | Refills: 3 | Status: SHIPPED | OUTPATIENT
Start: 2025-05-27

## 2025-05-27 NOTE — TELEPHONE ENCOUNTER
[facility] Call Center    Phone Message    May a detailed message be left on voicemail: yes     Reason for Call: Medication Question or concern regarding medication   Prescription Clarification  Name of Medication: Fluticasone-Umeclidin-Vilanterol (TRELEGY ELLIPTA) 200-62.5-25 MCG/ACT oral inhaler   Prescribing Provider: Brunilda Bullock MD    Pharmacy:   [facility] - [address]      What on the order needs clarification? Pt is requesting for 2- 90 day supply rxs to avoid calling often to request medication, per pt. Most recent Trelegy rx was sent on 5/7/25 w/ 3 refills. Please advise.      Action Taken: Other: PULM    Travel Screening: Not Applicable     Date of Service:

## 2025-06-30 ENCOUNTER — TELEPHONE (OUTPATIENT)
Dept: PULMONOLOGY | Facility: CLINIC | Age: 70
End: 2025-06-30
Payer: COMMERCIAL

## 2025-06-30 NOTE — TELEPHONE ENCOUNTER
Patient Contacted for the patient to call back and schedule the following:    Appointment type: rtn  Provider: fellows  Return date: nov 2025  Specialty phone number: [phone]  Additional appointment(s) needed: chest ct  Additonal Notes: na

## 2025-08-04 ENCOUNTER — OFFICE VISIT (OUTPATIENT)
Dept: URGENT CARE | Facility: URGENT CARE | Age: 70
End: 2025-08-04
Payer: COMMERCIAL

## 2025-08-04 VITALS
BODY MASS INDEX: 19.7 KG/M2 | DIASTOLIC BLOOD PRESSURE: 93 MMHG | SYSTOLIC BLOOD PRESSURE: 153 MMHG | OXYGEN SATURATION: 97 % | HEIGHT: 68 IN | TEMPERATURE: 97.8 F | WEIGHT: 130 LBS | RESPIRATION RATE: 16 BRPM | HEART RATE: 75 BPM

## 2025-08-04 DIAGNOSIS — S61.512A LACERATION OF LEFT WRIST, INITIAL ENCOUNTER: Primary | ICD-10-CM

## 2025-08-04 PROCEDURE — 12002 RPR S/N/AX/GEN/TRNK2.6-7.5CM: CPT | Performed by: PHYSICIAN ASSISTANT

## 2025-08-04 PROCEDURE — 3077F SYST BP >= 140 MM HG: CPT | Performed by: PHYSICIAN ASSISTANT

## 2025-08-04 PROCEDURE — 3080F DIAST BP >= 90 MM HG: CPT | Performed by: PHYSICIAN ASSISTANT

## 2025-08-13 ENCOUNTER — OFFICE VISIT (OUTPATIENT)
Dept: URGENT CARE | Facility: URGENT CARE | Age: 70
End: 2025-08-13
Payer: COMMERCIAL

## 2025-08-13 DIAGNOSIS — S61.502D OPEN WOUND OF LEFT WRIST, SUBSEQUENT ENCOUNTER: Primary | ICD-10-CM

## 2025-08-13 PROCEDURE — 99207 PR NO CHARGE NURSE ONLY: CPT

## (undated) RX ORDER — ALBUTEROL SULFATE 0.83 MG/ML
SOLUTION RESPIRATORY (INHALATION)
Status: DISPENSED
Start: 2017-05-24